# Patient Record
Sex: MALE | Race: WHITE | NOT HISPANIC OR LATINO | URBAN - METROPOLITAN AREA
[De-identification: names, ages, dates, MRNs, and addresses within clinical notes are randomized per-mention and may not be internally consistent; named-entity substitution may affect disease eponyms.]

---

## 2020-06-16 RX ORDER — CARVEDILOL 25 MG/1
25 TABLET ORAL 2 TIMES DAILY WITH MEALS
COMMUNITY

## 2020-06-16 RX ORDER — ASPIRIN 81 MG/1
81 TABLET ORAL DAILY
Status: ON HOLD | COMMUNITY
End: 2020-06-26 | Stop reason: SDUPTHER

## 2020-06-16 RX ORDER — UBIDECARENONE 100 MG
100 CAPSULE ORAL DAILY
Status: ON HOLD | COMMUNITY
End: 2020-06-26 | Stop reason: SDUPTHER

## 2020-06-16 RX ORDER — VIT C/E/ZN/COPPR/LUTEIN/ZEAXAN 250MG-90MG
1000 CAPSULE ORAL DAILY
COMMUNITY

## 2020-06-16 RX ORDER — RAMIPRIL 10 MG/1
10 CAPSULE ORAL EVERY MORNING
COMMUNITY

## 2020-06-16 RX ORDER — ATORVASTATIN CALCIUM 40 MG/1
40 TABLET, FILM COATED ORAL NIGHTLY
COMMUNITY

## 2020-06-16 RX ORDER — ALBUTEROL SULFATE 90 UG/1
2 INHALANT RESPIRATORY (INHALATION) EVERY 6 HOURS PRN
COMMUNITY

## 2020-06-16 SDOH — HEALTH STABILITY: MENTAL HEALTH: HOW OFTEN DO YOU HAVE A DRINK CONTAINING ALCOHOL?: 4 OR MORE TIMES A WEEK

## 2020-06-16 NOTE — PRE-PROCEDURE INSTRUCTIONS
1. Admissions will call you with your arrival time on Thurs 6/25/20 between 2pm - 4pm. For questions about your arrival time, please call [phone].    2. Please report to the [facility] in the [place] on the day of your procedure. Enter the hospital through the [place] [facility] (main entrance at [address], [place]). Bring your insurance card and photo ID.     3. Please follow these fasting guidelines:  - STOP all solid food 8 hours prior to arrival.   - No more than 12oz of water is permitted and must STOP 2 HOURS prior to arrival to the hospital.     4. Early on the morning of the procedure, please take the following medications listed below with a sip of water, in addition to any medications prescribed by your surgeon: coreg, davidgy    *NO aspirin, ibuprofen, anti-inflammatories, fish oil or Vitamin E unless ordered by physician.      5. Other instructions: antibacterial shower x 2, brush teeth, bring inhalers and CPAP    6. If you develop a cough, cold, fever, or other symptom prior to the date of the procedure, please report it to your physician immediately.    7. If you need to cancel the procedure for any reason, please contact your physician.    8. Make arrangements to have someone drive you home from the procedure. If you have not arranged for transportation home, your surgery may be cancelled.     9. You may not take public transportation or or a cab unless accompanied by a responsible person.    10. You may not drive a car or operate complex or potentially dangerous machinery for 24 hours following anesthesia and/or sedation.    11. If it is medically necessary for you to have a longer stay, you will be informed as soon as the decision is made.    12. Do not wear or bring anything of value to the hospital, including jewelry of any kind. Do not wear make-up or contact lenses. DO bring your glasses and hearing aid, with a case.    13. Dress in comfortable clothes.    14. If  instructed, please bring a copy of your Advance Directive (Living Will/Durable Power of ) on the day of your procedure.    Pre operative instructions given as per protocol.  Form explained by:     Sheree Hinton RN, BSN

## 2020-06-17 ENCOUNTER — TRANSCRIBE ORDERS (OUTPATIENT)
Dept: PREADMISSION TESTING | Facility: HOSPITAL | Age: 70
End: 2020-06-17

## 2020-06-17 ENCOUNTER — APPOINTMENT (OUTPATIENT)
Dept: PREADMISSION TESTING | Facility: HOSPITAL | Age: 70
End: 2020-06-17
Attending: ORTHOPAEDIC SURGERY
Payer: MEDICARE

## 2020-06-17 ENCOUNTER — APPOINTMENT (OUTPATIENT)
Dept: LAB | Facility: HOSPITAL | Age: 70
End: 2020-06-17
Attending: ORTHOPAEDIC SURGERY
Payer: MEDICARE

## 2020-06-17 ENCOUNTER — HOSPITAL ENCOUNTER (OUTPATIENT)
Dept: CARDIOLOGY | Facility: HOSPITAL | Age: 70
Discharge: HOME | End: 2020-06-17
Attending: ORTHOPAEDIC SURGERY
Payer: MEDICARE

## 2020-06-17 DIAGNOSIS — E78.5 DYSLIPIDEMIA: ICD-10-CM

## 2020-06-17 DIAGNOSIS — M48.062 SPINAL STENOSIS, LUMBAR REGION WITH NEUROGENIC CLAUDICATION: ICD-10-CM

## 2020-06-17 DIAGNOSIS — Z01.818 ENCOUNTER FOR OTHER PREPROCEDURAL EXAMINATION: ICD-10-CM

## 2020-06-17 DIAGNOSIS — M48.062 SPINAL STENOSIS, LUMBAR REGION WITH NEUROGENIC CLAUDICATION: Primary | ICD-10-CM

## 2020-06-17 DIAGNOSIS — Z78.9 ALCOHOL USE: ICD-10-CM

## 2020-06-17 DIAGNOSIS — I10 ESSENTIAL HYPERTENSION: ICD-10-CM

## 2020-06-17 DIAGNOSIS — Z72.0 TOBACCO ABUSE: ICD-10-CM

## 2020-06-17 DIAGNOSIS — G47.33 OBSTRUCTIVE SLEEP APNEA SYNDROME: ICD-10-CM

## 2020-06-17 DIAGNOSIS — J44.9 CHRONIC OBSTRUCTIVE PULMONARY DISEASE, UNSPECIFIED COPD TYPE (CMS/HCC): ICD-10-CM

## 2020-06-17 DIAGNOSIS — Z01.818 PRE-OP EVALUATION: Primary | ICD-10-CM

## 2020-06-17 DIAGNOSIS — Z11.59 ENCOUNTER FOR SCREENING FOR OTHER VIRAL DISEASES: ICD-10-CM

## 2020-06-17 DIAGNOSIS — I25.10 CORONARY ARTERY DISEASE INVOLVING NATIVE CORONARY ARTERY OF NATIVE HEART WITHOUT ANGINA PECTORIS: ICD-10-CM

## 2020-06-17 DIAGNOSIS — M48.062 PSEUDOCLAUDICATION SYNDROME: ICD-10-CM

## 2020-06-17 DIAGNOSIS — R73.03 PRE-DIABETES: ICD-10-CM

## 2020-06-17 LAB
ABO + RH BLD: NORMAL
ALBUMIN SERPL-MCNC: 3.8 G/DL (ref 3.4–5)
ALP SERPL-CCNC: 90 IU/L (ref 35–126)
ALT SERPL-CCNC: 24 IU/L (ref 16–63)
ANION GAP SERPL CALC-SCNC: 9 MEQ/L (ref 3–15)
AST SERPL-CCNC: 23 IU/L (ref 15–41)
ATRIAL RATE: 74
BILIRUB SERPL-MCNC: 0.9 MG/DL (ref 0.3–1.2)
BLD GP AB SCN SERPL QL: NEGATIVE
BLOOD BANK CMNT PATIENT-IMP: NORMAL
BUN SERPL-MCNC: 21 MG/DL (ref 8–20)
CALCIUM SERPL-MCNC: 9.4 MG/DL (ref 8.9–10.3)
CHLORIDE SERPL-SCNC: 100 MEQ/L (ref 98–109)
CO2 SERPL-SCNC: 27 MEQ/L (ref 22–32)
CREAT SERPL-MCNC: 0.9 MG/DL (ref 0.8–1.3)
D AG BLD QL: POSITIVE
ERYTHROCYTE [DISTWIDTH] IN BLOOD BY AUTOMATED COUNT: 12.7 % (ref 11.6–14.4)
EST. AVERAGE GLUCOSE BLD GHB EST-MCNC: 120 MG/DL
GFR SERPL CREATININE-BSD FRML MDRD: >60 ML/MIN/1.73M*2
GLUCOSE SERPL-MCNC: 92 MG/DL (ref 70–99)
HBA1C MFR BLD HPLC: 5.8 %
HCT VFR BLDCO AUTO: 47 % (ref 40.1–51)
HGB BLD-MCNC: 16 G/DL (ref 13.7–17.5)
LABORATORY COMMENT REPORT: NORMAL
MCH RBC QN AUTO: 33 PG (ref 28–33.2)
MCHC RBC AUTO-ENTMCNC: 34 G/DL (ref 32.2–36.5)
MCV RBC AUTO: 96.9 FL (ref 83–98)
P AXIS: 55
PDW BLD AUTO: 8.7 FL (ref 9.4–12.4)
PLATELET # BLD AUTO: 144 K/UL (ref 150–350)
POTASSIUM SERPL-SCNC: 4.1 MEQ/L (ref 3.6–5.1)
PR INTERVAL: 170
PROT SERPL-MCNC: 7.1 G/DL (ref 6–8.2)
QRS DURATION: 144
QT INTERVAL: 412
QTC CALCULATION(BAZETT): 457
R AXIS: 57
RBC # BLD AUTO: 4.85 M/UL (ref 4.5–5.8)
SODIUM SERPL-SCNC: 136 MEQ/L (ref 136–144)
T WAVE AXIS: 42
VENTRICULAR RATE: 74
WBC # BLD AUTO: 6.78 K/UL (ref 3.8–10.5)

## 2020-06-17 PROCEDURE — 86901 BLOOD TYPING SEROLOGIC RH(D): CPT

## 2020-06-17 PROCEDURE — 85027 COMPLETE CBC AUTOMATED: CPT

## 2020-06-17 PROCEDURE — 36415 COLL VENOUS BLD VENIPUNCTURE: CPT

## 2020-06-17 PROCEDURE — 83036 HEMOGLOBIN GLYCOSYLATED A1C: CPT | Performed by: HOSPITALIST

## 2020-06-17 PROCEDURE — 99204 OFFICE O/P NEW MOD 45 MIN: CPT | Performed by: HOSPITALIST

## 2020-06-17 PROCEDURE — 82040 ASSAY OF SERUM ALBUMIN: CPT

## 2020-06-17 PROCEDURE — 93005 ELECTROCARDIOGRAM TRACING: CPT

## 2020-06-17 NOTE — ASSESSMENT & PLAN NOTE
Drinks 2 beers with dinner  Instructed to not to drink any alcohol 3 days prior to the surgery  Patient has already quit alcohol 4 days ago

## 2020-06-17 NOTE — ASSESSMENT & PLAN NOTE
Continue bb  Hold Altace on morning of surgery and post op till documentation of normal kidney functions.

## 2020-06-17 NOTE — ASSESSMENT & PLAN NOTE
Okay to proceed with surgery without additional testing  Already cleared by pulmonary and cardiology
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
## 2020-06-17 NOTE — ASSESSMENT & PLAN NOTE
Continue CPAP post -operatively. He uses 2 L oxygen with CPAP at night  I would also recommend use of our LATRELL protocol as LATRELL places the patient at relatively increased risk (compared to a population without this diagnosis) of oxygen desaturation, cardiac events, acute respiratory failure, or an ICU transfer.
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
## 2020-06-18 VITALS
RESPIRATION RATE: 18 BRPM | SYSTOLIC BLOOD PRESSURE: 142 MMHG | OXYGEN SATURATION: 95 % | HEIGHT: 65 IN | HEART RATE: 76 BPM | TEMPERATURE: 98.1 F | DIASTOLIC BLOOD PRESSURE: 78 MMHG | BODY MASS INDEX: 37.1 KG/M2 | WEIGHT: 222.7 LBS

## 2020-06-18 PROBLEM — R73.03 PRE-DIABETES: Status: ACTIVE | Noted: 2020-06-18

## 2020-06-23 ENCOUNTER — APPOINTMENT (OUTPATIENT)
Dept: LAB | Facility: HOSPITAL | Age: 70
DRG: 516 | End: 2020-06-23
Attending: ORTHOPAEDIC SURGERY
Payer: MEDICARE

## 2020-06-23 ENCOUNTER — HOSPITAL ENCOUNTER (OUTPATIENT)
Dept: CARDIOLOGY | Facility: HOSPITAL | Age: 70
Discharge: HOME | DRG: 516 | End: 2020-06-23
Attending: ORTHOPAEDIC SURGERY
Payer: MEDICARE

## 2020-06-23 ENCOUNTER — APPOINTMENT (OUTPATIENT)
Dept: PREADMISSION TESTING | Facility: HOSPITAL | Age: 70
DRG: 516 | End: 2020-06-23
Attending: ORTHOPAEDIC SURGERY
Payer: MEDICARE

## 2020-06-23 DIAGNOSIS — M48.062 SPINAL STENOSIS, LUMBAR REGION WITH NEUROGENIC CLAUDICATION: ICD-10-CM

## 2020-06-23 DIAGNOSIS — Z01.818 ENCOUNTER FOR OTHER PREPROCEDURAL EXAMINATION: ICD-10-CM

## 2020-06-23 DIAGNOSIS — Z11.59 ENCOUNTER FOR SCREENING FOR OTHER VIRAL DISEASES: ICD-10-CM

## 2020-06-23 PROCEDURE — U0002 COVID-19 LAB TEST NON-CDC: HCPCS

## 2020-06-25 LAB — SARS-COV-2 RNA RESP QL NAA+PROBE: NOT DETECTED

## 2020-06-26 ENCOUNTER — ANESTHESIA (INPATIENT)
Dept: OPERATING ROOM | Facility: HOSPITAL | Age: 70
DRG: 516 | End: 2020-06-26
Payer: MEDICARE

## 2020-06-26 ENCOUNTER — HOSPITAL ENCOUNTER (INPATIENT)
Facility: HOSPITAL | Age: 70
LOS: 4 days | Discharge: HOME HEALTH | DRG: 516 | End: 2020-06-30
Attending: ORTHOPAEDIC SURGERY | Admitting: ORTHOPAEDIC SURGERY
Payer: MEDICARE

## 2020-06-26 ENCOUNTER — APPOINTMENT (INPATIENT)
Dept: RADIOLOGY | Facility: HOSPITAL | Age: 70
DRG: 516 | End: 2020-06-26
Attending: ORTHOPAEDIC SURGERY
Payer: MEDICARE

## 2020-06-26 ENCOUNTER — ANESTHESIA EVENT (INPATIENT)
Dept: OPERATING ROOM | Facility: HOSPITAL | Age: 70
DRG: 516 | End: 2020-06-26
Payer: MEDICARE

## 2020-06-26 DIAGNOSIS — M48.061 SPINAL STENOSIS OF LUMBAR REGION, UNSPECIFIED WHETHER NEUROGENIC CLAUDICATION PRESENT: Primary | ICD-10-CM

## 2020-06-26 PROBLEM — I27.20 PULMONARY HYPERTENSION (CMS/HCC): Status: ACTIVE | Noted: 2020-06-26

## 2020-06-26 LAB
ABO + RH BLD: NORMAL
D AG BLD QL: POSITIVE
LABORATORY COMMENT REPORT: NORMAL

## 2020-06-26 PROCEDURE — 25800000 HC PHARMACY IV SOLUTIONS: Performed by: NURSE PRACTITIONER

## 2020-06-26 PROCEDURE — 72020 X-RAY EXAM OF SPINE 1 VIEW: CPT

## 2020-06-26 PROCEDURE — 71000011 HC PACU PHASE 1 EA ADDL MIN: Performed by: ORTHOPAEDIC SURGERY

## 2020-06-26 PROCEDURE — 01NB0ZZ RELEASE LUMBAR NERVE, OPEN APPROACH: ICD-10-PCS | Performed by: ORTHOPAEDIC SURGERY

## 2020-06-26 PROCEDURE — 63600000 HC DRUGS/DETAIL CODE: Performed by: NURSE PRACTITIONER

## 2020-06-26 PROCEDURE — 63600000 HC DRUGS/DETAIL CODE: Performed by: ORTHOPAEDIC SURGERY

## 2020-06-26 PROCEDURE — 63700000 HC SELF-ADMINISTRABLE DRUG

## 2020-06-26 PROCEDURE — 71000001 HC PACU PHASE 1 INITIAL 30MIN: Performed by: ORTHOPAEDIC SURGERY

## 2020-06-26 PROCEDURE — 36415 COLL VENOUS BLD VENIPUNCTURE: CPT | Performed by: ORTHOPAEDIC SURGERY

## 2020-06-26 PROCEDURE — C1776 JOINT DEVICE (IMPLANTABLE): HCPCS | Performed by: ORTHOPAEDIC SURGERY

## 2020-06-26 PROCEDURE — 12000000 HC ROOM AND CARE MED/SURG

## 2020-06-26 PROCEDURE — 37000001 HC ANESTHESIA GENERAL: Performed by: ORTHOPAEDIC SURGERY

## 2020-06-26 PROCEDURE — 25000000 HC PHARMACY GENERAL: Performed by: ANESTHESIOLOGY

## 2020-06-26 PROCEDURE — 63600000 HC DRUGS/DETAIL CODE: Performed by: ANESTHESIOLOGY

## 2020-06-26 PROCEDURE — 99232 SBSQ HOSP IP/OBS MODERATE 35: CPT | Performed by: HOSPITALIST

## 2020-06-26 PROCEDURE — 25800000 HC PHARMACY IV SOLUTIONS: Performed by: ANESTHESIOLOGY

## 2020-06-26 PROCEDURE — 27200000 HC STERILE SUPPLY: Performed by: ORTHOPAEDIC SURGERY

## 2020-06-26 PROCEDURE — 36000004 HC OR LEVEL 4 INITIAL 30MIN: Performed by: ORTHOPAEDIC SURGERY

## 2020-06-26 PROCEDURE — 36000014 HC OR LEVEL 4 EA ADDL MIN: Performed by: ORTHOPAEDIC SURGERY

## 2020-06-26 PROCEDURE — 25000000 HC PHARMACY GENERAL: Performed by: ORTHOPAEDIC SURGERY

## 2020-06-26 PROCEDURE — 01NR0ZZ RELEASE SACRAL NERVE, OPEN APPROACH: ICD-10-PCS | Performed by: ORTHOPAEDIC SURGERY

## 2020-06-26 PROCEDURE — 63700000 HC SELF-ADMINISTRABLE DRUG: Performed by: NURSE PRACTITIONER

## 2020-06-26 RX ORDER — HYDROMORPHONE HYDROCHLORIDE 1 MG/ML
0.5 INJECTION, SOLUTION INTRAMUSCULAR; INTRAVENOUS; SUBCUTANEOUS
Status: DISCONTINUED | OUTPATIENT
Start: 2020-06-26 | End: 2020-06-30 | Stop reason: HOSPADM

## 2020-06-26 RX ORDER — ONDANSETRON HYDROCHLORIDE 2 MG/ML
4 INJECTION, SOLUTION INTRAVENOUS
Status: DISCONTINUED | OUTPATIENT
Start: 2020-06-26 | End: 2020-06-30 | Stop reason: HOSPADM

## 2020-06-26 RX ORDER — CYCLOBENZAPRINE HCL 10 MG
TABLET ORAL
Status: COMPLETED
Start: 2020-06-26 | End: 2020-06-26

## 2020-06-26 RX ORDER — ROCURONIUM BROMIDE 10 MG/ML
INJECTION, SOLUTION INTRAVENOUS AS NEEDED
Status: DISCONTINUED | OUTPATIENT
Start: 2020-06-26 | End: 2020-06-26 | Stop reason: SURG

## 2020-06-26 RX ORDER — GLYCOPYRROLATE 0.6MG/3ML
SYRINGE (ML) INTRAVENOUS AS NEEDED
Status: DISCONTINUED | OUTPATIENT
Start: 2020-06-26 | End: 2020-06-26 | Stop reason: SURG

## 2020-06-26 RX ORDER — DOCUSATE SODIUM 100 MG/1
100 CAPSULE, LIQUID FILLED ORAL 2 TIMES DAILY PRN
Start: 2020-06-26 | End: 2020-07-26

## 2020-06-26 RX ORDER — IBUPROFEN 200 MG
16-32 TABLET ORAL AS NEEDED
Status: CANCELLED | OUTPATIENT
Start: 2020-06-26

## 2020-06-26 RX ORDER — IBUPROFEN 200 MG
16-32 TABLET ORAL AS NEEDED
Status: DISCONTINUED | OUTPATIENT
Start: 2020-06-26 | End: 2020-06-30 | Stop reason: HOSPADM

## 2020-06-26 RX ORDER — MIDAZOLAM HYDROCHLORIDE 2 MG/2ML
INJECTION, SOLUTION INTRAMUSCULAR; INTRAVENOUS AS NEEDED
Status: DISCONTINUED | OUTPATIENT
Start: 2020-06-26 | End: 2020-06-26 | Stop reason: SURG

## 2020-06-26 RX ORDER — HYDROCODONE BITARTRATE AND ACETAMINOPHEN 5; 325 MG/1; MG/1
1-2 TABLET ORAL EVERY 4 HOURS PRN
Start: 2020-06-26 | End: 2020-07-01

## 2020-06-26 RX ORDER — BUPIVACAINE HCL/EPINEPHRINE 0.5-1:200K
VIAL (ML) INJECTION AS NEEDED
Status: DISCONTINUED | OUTPATIENT
Start: 2020-06-26 | End: 2020-06-26 | Stop reason: HOSPADM

## 2020-06-26 RX ORDER — FENTANYL CITRATE 50 UG/ML
50 INJECTION, SOLUTION INTRAMUSCULAR; INTRAVENOUS
Status: DISCONTINUED | OUTPATIENT
Start: 2020-06-26 | End: 2020-06-30 | Stop reason: HOSPADM

## 2020-06-26 RX ORDER — IPRATROPIUM BROMIDE AND ALBUTEROL SULFATE 2.5; .5 MG/3ML; MG/3ML
3 SOLUTION RESPIRATORY (INHALATION) ONCE
Status: COMPLETED | OUTPATIENT
Start: 2020-06-26 | End: 2020-06-26

## 2020-06-26 RX ORDER — PREGABALIN 75 MG/1
150 CAPSULE ORAL
Status: COMPLETED | OUTPATIENT
Start: 2020-06-26 | End: 2020-06-26

## 2020-06-26 RX ORDER — CEFAZOLIN SODIUM 2 G/50ML
SOLUTION INTRAVENOUS
Status: COMPLETED
Start: 2020-06-26 | End: 2020-06-26

## 2020-06-26 RX ORDER — SODIUM CHLORIDE, SODIUM GLUCONATE, SODIUM ACETATE, POTASSIUM CHLORIDE AND MAGNESIUM CHLORIDE 30; 37; 368; 526; 502 MG/100ML; MG/100ML; MG/100ML; MG/100ML; MG/100ML
INJECTION, SOLUTION INTRAVENOUS CONTINUOUS PRN
Status: DISCONTINUED | OUTPATIENT
Start: 2020-06-26 | End: 2020-06-26 | Stop reason: SURG

## 2020-06-26 RX ORDER — ALBUTEROL SULFATE 90 UG/1
2 INHALANT RESPIRATORY (INHALATION) EVERY 6 HOURS PRN
Status: DISCONTINUED | OUTPATIENT
Start: 2020-06-26 | End: 2020-06-30 | Stop reason: HOSPADM

## 2020-06-26 RX ORDER — ONDANSETRON HYDROCHLORIDE 2 MG/ML
4 INJECTION, SOLUTION INTRAVENOUS EVERY 8 HOURS PRN
Status: DISCONTINUED | OUTPATIENT
Start: 2020-06-26 | End: 2020-06-30 | Stop reason: HOSPADM

## 2020-06-26 RX ORDER — AMOXICILLIN 250 MG
1 CAPSULE ORAL 2 TIMES DAILY
Status: DISCONTINUED | OUTPATIENT
Start: 2020-06-26 | End: 2020-06-30 | Stop reason: HOSPADM

## 2020-06-26 RX ORDER — DEXTROSE 40 %
15-30 GEL (GRAM) ORAL AS NEEDED
Status: CANCELLED | OUTPATIENT
Start: 2020-06-26

## 2020-06-26 RX ORDER — LABETALOL HCL 20 MG/4 ML
5 SYRINGE (ML) INTRAVENOUS AS NEEDED
Status: DISCONTINUED | OUTPATIENT
Start: 2020-06-26 | End: 2020-06-30 | Stop reason: HOSPADM

## 2020-06-26 RX ORDER — IBUPROFEN 200 MG
400 TABLET ORAL EVERY 6 HOURS PRN
COMMUNITY
End: 2020-06-30 | Stop reason: HOSPADM

## 2020-06-26 RX ORDER — PHENYLEPHRINE HYDROCHLORIDE 10 MG/ML
INJECTION INTRAVENOUS AS NEEDED
Status: DISCONTINUED | OUTPATIENT
Start: 2020-06-26 | End: 2020-06-26 | Stop reason: SURG

## 2020-06-26 RX ORDER — ASPIRIN 81 MG/1
81 TABLET ORAL DAILY
Refills: 0
Start: 2020-06-29

## 2020-06-26 RX ORDER — DEXTROSE 50 % IN WATER (D50W) INTRAVENOUS SYRINGE
25 AS NEEDED
Status: CANCELLED | OUTPATIENT
Start: 2020-06-26

## 2020-06-26 RX ORDER — DEXAMETHASONE SODIUM PHOSPHATE 4 MG/ML
INJECTION, SOLUTION INTRA-ARTICULAR; INTRALESIONAL; INTRAMUSCULAR; INTRAVENOUS; SOFT TISSUE AS NEEDED
Status: DISCONTINUED | OUTPATIENT
Start: 2020-06-26 | End: 2020-06-26 | Stop reason: SURG

## 2020-06-26 RX ORDER — ONDANSETRON HYDROCHLORIDE 2 MG/ML
INJECTION, SOLUTION INTRAVENOUS AS NEEDED
Status: DISCONTINUED | OUTPATIENT
Start: 2020-06-26 | End: 2020-06-26 | Stop reason: SURG

## 2020-06-26 RX ORDER — ACETAMINOPHEN 325 MG/1
TABLET ORAL
Status: COMPLETED
Start: 2020-06-26 | End: 2020-06-26

## 2020-06-26 RX ORDER — PREGABALIN 75 MG/1
CAPSULE ORAL
Status: COMPLETED
Start: 2020-06-26 | End: 2020-06-26

## 2020-06-26 RX ORDER — UBIDECARENONE 100 MG
100 CAPSULE ORAL DAILY
Qty: 30 CAPSULE | Refills: 0
Start: 2020-06-26 | End: 2020-07-26

## 2020-06-26 RX ORDER — ATORVASTATIN CALCIUM 40 MG/1
40 TABLET, FILM COATED ORAL NIGHTLY
Status: DISCONTINUED | OUTPATIENT
Start: 2020-06-26 | End: 2020-06-30 | Stop reason: HOSPADM

## 2020-06-26 RX ORDER — CYCLOBENZAPRINE HCL 10 MG
10 TABLET ORAL EVERY 8 HOURS PRN
Refills: 0
Start: 2020-06-26 | End: 2020-07-26

## 2020-06-26 RX ORDER — VANCOMYCIN HYDROCHLORIDE 500 MG/10ML
INJECTION, POWDER, LYOPHILIZED, FOR SOLUTION INTRAVENOUS AS NEEDED
Status: DISCONTINUED | OUTPATIENT
Start: 2020-06-26 | End: 2020-06-26 | Stop reason: HOSPADM

## 2020-06-26 RX ORDER — BISACODYL 10 MG/1
10 SUPPOSITORY RECTAL DAILY PRN
Status: DISCONTINUED | OUTPATIENT
Start: 2020-06-26 | End: 2020-06-30 | Stop reason: HOSPADM

## 2020-06-26 RX ORDER — CYCLOBENZAPRINE HCL 10 MG
10 TABLET ORAL
Status: COMPLETED | OUTPATIENT
Start: 2020-06-26 | End: 2020-06-26

## 2020-06-26 RX ORDER — RAMIPRIL 5 MG/1
10 CAPSULE ORAL EVERY MORNING
Status: DISCONTINUED | OUTPATIENT
Start: 2020-06-26 | End: 2020-06-30 | Stop reason: HOSPADM

## 2020-06-26 RX ORDER — CARVEDILOL 25 MG/1
25 TABLET ORAL 2 TIMES DAILY WITH MEALS
Status: DISCONTINUED | OUTPATIENT
Start: 2020-06-27 | End: 2020-06-30 | Stop reason: HOSPADM

## 2020-06-26 RX ORDER — ETOMIDATE 2 MG/ML
INJECTION INTRAVENOUS AS NEEDED
Status: DISCONTINUED | OUTPATIENT
Start: 2020-06-26 | End: 2020-06-26 | Stop reason: SURG

## 2020-06-26 RX ORDER — DIPHENHYDRAMINE HCL 25 MG
25 CAPSULE ORAL EVERY 6 HOURS PRN
Status: DISCONTINUED | OUTPATIENT
Start: 2020-06-26 | End: 2020-06-30 | Stop reason: HOSPADM

## 2020-06-26 RX ORDER — HYDROCODONE BITARTRATE AND ACETAMINOPHEN 5; 325 MG/1; MG/1
1-2 TABLET ORAL EVERY 4 HOURS PRN
Status: DISCONTINUED | OUTPATIENT
Start: 2020-06-26 | End: 2020-06-30 | Stop reason: HOSPADM

## 2020-06-26 RX ORDER — CYCLOBENZAPRINE HCL 10 MG
10 TABLET ORAL EVERY 8 HOURS PRN
Status: DISCONTINUED | OUTPATIENT
Start: 2020-06-26 | End: 2020-06-30 | Stop reason: HOSPADM

## 2020-06-26 RX ORDER — DEXTROSE 50 % IN WATER (D50W) INTRAVENOUS SYRINGE
25 AS NEEDED
Status: DISCONTINUED | OUTPATIENT
Start: 2020-06-26 | End: 2020-06-30 | Stop reason: HOSPADM

## 2020-06-26 RX ORDER — SODIUM CHLORIDE 9 MG/ML
INJECTION, SOLUTION INTRAVENOUS CONTINUOUS
Status: ACTIVE | OUTPATIENT
Start: 2020-06-26 | End: 2020-06-27

## 2020-06-26 RX ORDER — DEXTROSE 40 %
15-30 GEL (GRAM) ORAL AS NEEDED
Status: DISCONTINUED | OUTPATIENT
Start: 2020-06-26 | End: 2020-06-30 | Stop reason: HOSPADM

## 2020-06-26 RX ORDER — PROPOFOL 10 MG/ML
INJECTION, EMULSION INTRAVENOUS AS NEEDED
Status: DISCONTINUED | OUTPATIENT
Start: 2020-06-26 | End: 2020-06-26 | Stop reason: SURG

## 2020-06-26 RX ORDER — CEFAZOLIN SODIUM 2 G/50ML
2 SOLUTION INTRAVENOUS
Status: COMPLETED | OUTPATIENT
Start: 2020-06-26 | End: 2020-06-26

## 2020-06-26 RX ORDER — ACETAMINOPHEN 325 MG/1
975 TABLET ORAL
Status: COMPLETED | OUTPATIENT
Start: 2020-06-26 | End: 2020-06-26

## 2020-06-26 RX ORDER — NEOSTIGMINE METHYLSULFATE 1 MG/ML
INJECTION INTRAVENOUS AS NEEDED
Status: DISCONTINUED | OUTPATIENT
Start: 2020-06-26 | End: 2020-06-26 | Stop reason: SURG

## 2020-06-26 RX ORDER — POLYETHYLENE GLYCOL 3350 17 G/17G
17 POWDER, FOR SOLUTION ORAL DAILY
Status: DISCONTINUED | OUTPATIENT
Start: 2020-06-27 | End: 2020-06-30 | Stop reason: HOSPADM

## 2020-06-26 RX ORDER — SODIUM CHLORIDE 9 MG/ML
INJECTION, SOLUTION INTRAVENOUS CONTINUOUS PRN
Status: DISCONTINUED | OUTPATIENT
Start: 2020-06-26 | End: 2020-06-26 | Stop reason: SURG

## 2020-06-26 RX ORDER — OXYCODONE HYDROCHLORIDE 5 MG/1
5-10 TABLET ORAL EVERY 4 HOURS PRN
Status: DISCONTINUED | OUTPATIENT
Start: 2020-06-26 | End: 2020-06-26

## 2020-06-26 RX ORDER — HEPARIN SODIUM 5000 [USP'U]/ML
5000 INJECTION, SOLUTION INTRAVENOUS; SUBCUTANEOUS EVERY 8 HOURS
Status: DISCONTINUED | OUTPATIENT
Start: 2020-06-28 | End: 2020-06-30 | Stop reason: HOSPADM

## 2020-06-26 RX ORDER — HYDROMORPHONE HYDROCHLORIDE 1 MG/ML
INJECTION, SOLUTION INTRAMUSCULAR; INTRAVENOUS; SUBCUTANEOUS AS NEEDED
Status: DISCONTINUED | OUTPATIENT
Start: 2020-06-26 | End: 2020-06-26 | Stop reason: SURG

## 2020-06-26 RX ORDER — ALUMINUM HYDROXIDE, MAGNESIUM HYDROXIDE, AND SIMETHICONE 1200; 120; 1200 MG/30ML; MG/30ML; MG/30ML
30 SUSPENSION ORAL EVERY 4 HOURS PRN
Status: DISCONTINUED | OUTPATIENT
Start: 2020-06-26 | End: 2020-06-30 | Stop reason: HOSPADM

## 2020-06-26 RX ORDER — FENTANYL CITRATE 50 UG/ML
INJECTION, SOLUTION INTRAMUSCULAR; INTRAVENOUS AS NEEDED
Status: DISCONTINUED | OUTPATIENT
Start: 2020-06-26 | End: 2020-06-26 | Stop reason: SURG

## 2020-06-26 RX ADMIN — RAMIPRIL 10 MG: 5 CAPSULE ORAL at 20:14

## 2020-06-26 RX ADMIN — ROCURONIUM BROMIDE 45 MG: 10 INJECTION, SOLUTION INTRAVENOUS at 12:33

## 2020-06-26 RX ADMIN — Medication 10 MG: at 09:11

## 2020-06-26 RX ADMIN — PHENYLEPHRINE HYDROCHLORIDE 100 MCG: 10 INJECTION INTRAVENOUS at 13:25

## 2020-06-26 RX ADMIN — CYCLOBENZAPRINE HYDROCHLORIDE 10 MG: 10 TABLET, FILM COATED ORAL at 09:11

## 2020-06-26 RX ADMIN — SUCCINYLCHOLINE CHLORIDE 120 MG: 20 INJECTION, SOLUTION INTRAMUSCULAR; INTRAVENOUS; PARENTERAL at 12:30

## 2020-06-26 RX ADMIN — SODIUM CHLORIDE: 900 INJECTION, SOLUTION INTRAVENOUS at 12:27

## 2020-06-26 RX ADMIN — MIDAZOLAM HYDROCHLORIDE 2 MG: 1 INJECTION, SOLUTION INTRAMUSCULAR; INTRAVENOUS at 12:27

## 2020-06-26 RX ADMIN — ROCURONIUM BROMIDE 20 MG: 10 INJECTION, SOLUTION INTRAVENOUS at 13:16

## 2020-06-26 RX ADMIN — PHENYLEPHRINE HYDROCHLORIDE 25 MCG/MIN: 10 INJECTION INTRAVENOUS at 13:39

## 2020-06-26 RX ADMIN — ACETAMINOPHEN 975 MG: 325 TABLET ORAL at 09:10

## 2020-06-26 RX ADMIN — PROPOFOL 50 MG: 10 INJECTION, EMULSION INTRAVENOUS at 12:30

## 2020-06-26 RX ADMIN — HYDROCODONE BITARTRATE AND ACETAMINOPHEN 1 TABLET: 5; 325 TABLET ORAL at 23:59

## 2020-06-26 RX ADMIN — ETOMIDATE 12 MG: 2 INJECTION, SOLUTION INTRAVENOUS at 12:30

## 2020-06-26 RX ADMIN — IPRATROPIUM BROMIDE AND ALBUTEROL SULFATE 3 ML: 2.5; .5 SOLUTION RESPIRATORY (INHALATION) at 16:15

## 2020-06-26 RX ADMIN — ONDANSETRON 4 MG: 2 INJECTION INTRAMUSCULAR; INTRAVENOUS at 15:33

## 2020-06-26 RX ADMIN — PHENYLEPHRINE HYDROCHLORIDE 100 MCG: 10 INJECTION INTRAVENOUS at 13:03

## 2020-06-26 RX ADMIN — SENNOSIDES AND DOCUSATE SODIUM 1 TABLET: 8.6; 5 TABLET ORAL at 20:14

## 2020-06-26 RX ADMIN — FENTANYL CITRATE 50 MCG: 50 INJECTION, SOLUTION INTRAMUSCULAR; INTRAVENOUS at 15:51

## 2020-06-26 RX ADMIN — PHENYLEPHRINE HYDROCHLORIDE 100 MCG: 10 INJECTION INTRAVENOUS at 13:30

## 2020-06-26 RX ADMIN — PHENYLEPHRINE HYDROCHLORIDE 100 MCG: 10 INJECTION INTRAVENOUS at 12:53

## 2020-06-26 RX ADMIN — Medication 0.5 MG: at 15:49

## 2020-06-26 RX ADMIN — ROCURONIUM BROMIDE 5 MG: 10 INJECTION, SOLUTION INTRAVENOUS at 12:30

## 2020-06-26 RX ADMIN — CEFAZOLIN 2 G: 330 INJECTION, POWDER, FOR SOLUTION INTRAMUSCULAR; INTRAVENOUS at 21:30

## 2020-06-26 RX ADMIN — PHENYLEPHRINE HYDROCHLORIDE 100 MCG: 10 INJECTION INTRAVENOUS at 13:21

## 2020-06-26 RX ADMIN — CEFAZOLIN 2 G: 330 INJECTION, POWDER, FOR SOLUTION INTRAMUSCULAR; INTRAVENOUS at 12:45

## 2020-06-26 RX ADMIN — SODIUM CHLORIDE: 9 INJECTION, SOLUTION INTRAVENOUS at 20:15

## 2020-06-26 RX ADMIN — NEOSTIGMINE METHYLSULFATE 3 MG: 1 INJECTION INTRAVENOUS at 15:49

## 2020-06-26 RX ADMIN — FENTANYL CITRATE 50 MCG: 50 INJECTION, SOLUTION INTRAMUSCULAR; INTRAVENOUS at 15:49

## 2020-06-26 RX ADMIN — ATORVASTATIN CALCIUM 40 MG: 40 TABLET, FILM COATED ORAL at 22:47

## 2020-06-26 RX ADMIN — SODIUM CHLORIDE, SODIUM GLUCONATE, SODIUM ACETATE, POTASSIUM CHLORIDE AND MAGNESIUM CHLORIDE: 526; 502; 368; 37; 30 INJECTION, SOLUTION INTRAVENOUS at 12:35

## 2020-06-26 RX ADMIN — FENTANYL CITRATE 25 MCG: 50 INJECTION, SOLUTION INTRAMUSCULAR; INTRAVENOUS at 17:01

## 2020-06-26 RX ADMIN — FENTANYL CITRATE 50 MCG: 50 INJECTION, SOLUTION INTRAMUSCULAR; INTRAVENOUS at 16:32

## 2020-06-26 RX ADMIN — PHENYLEPHRINE HYDROCHLORIDE 100 MCG: 10 INJECTION INTRAVENOUS at 12:55

## 2020-06-26 RX ADMIN — FENTANYL CITRATE 100 MCG: 50 INJECTION, SOLUTION INTRAMUSCULAR; INTRAVENOUS at 12:30

## 2020-06-26 RX ADMIN — HYDROCODONE BITARTRATE AND ACETAMINOPHEN 1 TABLET: 5; 325 TABLET ORAL at 18:46

## 2020-06-26 RX ADMIN — CYCLOBENZAPRINE HYDROCHLORIDE 10 MG: 10 TABLET, FILM COATED ORAL at 18:17

## 2020-06-26 RX ADMIN — HYDROMORPHONE HYDROCHLORIDE 0.5 MG: 1 INJECTION, SOLUTION INTRAMUSCULAR; INTRAVENOUS; SUBCUTANEOUS at 12:36

## 2020-06-26 RX ADMIN — DEXAMETHASONE SODIUM PHOSPHATE 4 MG: 4 INJECTION, SOLUTION INTRA-ARTICULAR; INTRALESIONAL; INTRAMUSCULAR; INTRAVENOUS; SOFT TISSUE at 12:54

## 2020-06-26 RX ADMIN — PREGABALIN 150 MG: 75 CAPSULE ORAL at 09:10

## 2020-06-26 ASSESSMENT — LIFESTYLE VARIABLES: TOBACCO_USE: 1

## 2020-06-26 ASSESSMENT — PAIN SCALES - GENERAL: PAIN_LEVEL: 0
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
## 2020-06-26 NOTE — ANESTHESIA POSTPROCEDURE EVALUATION
Patient: Scottie Genao    Procedure Summary     Date:  06/26/20 Room / Location:  [facility] PAV OR 04 / [facility] OR PAV    Anesthesia Start:  1227 Anesthesia Stop:  1613    Procedure:  Posterior L1-S1 Laminectomy (N/A Back) Diagnosis:       Spinal stenosis, lumbar region, with neurogenic claudication      (Stenosis)    Surgeon:  Delfino Momin MD Responsible Provider:  Maida Melendrez MD    Anesthesia Type:  general ASA Status:  3          Anesthesia Type: general  PACU Vitals     No data found in the last 10 encounters.            Anesthesia Post Evaluation    Pain score: 0  Pain management: adequate  Patient location during evaluation: PACU  Patient participation: complete - patient participated  Level of consciousness: awake and alert  Cardiovascular status: acceptable  Airway Patency: adequate  Respiratory status: acceptable  Hydration status: acceptable  Anesthetic complications: no  Comments: Duoneb on arrival to PACU. Pt is a smoker and had mucus in ETT on extubation . Internal med doc at bedside in PACU. If no need for labs, will d/c A-line in PACU. Chhaya PERRY aware.

## 2020-06-26 NOTE — ASSESSMENT & PLAN NOTE
Hemoglobin A1c was 5.8 preoperatively.  Outpatient follow-up with PCP and consider lifestyle modification.

## 2020-06-26 NOTE — PROGRESS NOTES
Orthopaedic Spine Surgery Postoperative Check    [S]  Patient doing well postoperatively, resting comfortably in PACU. Mild incisional pain but otherwise doing well.     [O]    Vitals:    06/26/20 1630   BP: 137/81   Pulse: 75   Resp: 17   Temp:    SpO2:          Physical Exam    dressing in place, clean, dry, intact.  Drain: HV to suction x1    RLE:  Inspection: No gross deformity. Skin in tact.   Neurovascular: Palpable DP pulse. SILT L1-S1 distibution.  Motor: IP 5/5, Quad 5/5, TA 5/5, EHL 5/5, GS 5/5    LLE:  Inspection: No gross deformity. Skin in tact.   Neurovascular: Palpable DP pulse. SILT L1-S1 distibution.  Motor: IP 5/5, Quad 5/5, TA 5/5, EHL 5/5, GS 5/5    AP: 69 y.o. male POD 0 s/p L1-S1 PLD    -- Pain control  -- WB: WBAT  -- DVT: SQH on POD2  -- Advance diet as tolerated  -- Monitor LUZMARIA drain output, set to gravity  -- Continue waters  -- PT/OT  -- Monitor neurovascular status  - CAREN dressing to be applied    Vipin Henderson MD

## 2020-06-26 NOTE — CONSULTS
"   Hospital Medicine Service -  Daily Progress Note       SUBJECTIVE   Interval History: seen in pacu.   Having some back pain.  No cp or sob     OBJECTIVE      Vital signs in last 24 hours:  Temp:  [36.3 °C (97.4 °F)-36.4 °C (97.6 °F)] 36.4 °C (97.6 °F)  Heart Rate:  [69-86] 86  Resp:  [16-26] 26  BP: (154-162)/(91-93) 154/91    Intake/Output Summary (Last 24 hours) at 6/26/2020 1624  Last data filed at 6/26/2020 1612  Gross per 24 hour   Intake 1800 ml   Output -1550 ml   Net 3350 ml       PHYSICAL EXAMINATION      Physical Exam   Visit Vitals  BP (!) 154/91   Pulse 86   Temp 36.4 °C (97.6 °F) (Temporal)   Resp (!) 26   Ht 1.651 m (5' 5\")   Wt 98 kg (216 lb 1 oz)   SpO2 96%   BMI 35.95 kg/m²       Physical Exam  General Appearance:        Awake.   Head:    Normocephalic   Eyes:    No icterus   Respiratory:     Coarse sounds clear with coughing.   Cardiovascular:    Regular rate and rhythm   GI:     Soft, non-tender, bowel sounds active             LINES, CATHETERS, DRAINS, AIRWAYS, AND WOUNDS   Lines, Drains, Airways, Wounds:       Comments:      LABS / IMAGING / TELE      Labs  Creat 0.9 preop    ASSESSMENT AND PLAN      Spinal stenosis of lumbar region  Assessment & Plan  S/p lumbar surgery  Continue PT/OT  Encourage IS  Continue Bowel regimen  DVT prophylaxis and pain meds per ortho    Pulmonary hypertension (CMS/HCC)  Assessment & Plan  Patient noted to have mod-severe pulm HTN on outpt records/echo  Monitor respiratory and volume status      Obstructive sleep apnea syndrome  Assessment & Plan  Continue CPAP with 2L O2 at night and when napping  Minimize opiates and sedatives as able    Coronary artery disease involving native coronary artery of native heart without angina pectoris  Assessment & Plan  Continue beta-blocker and statin.  Resume aspirin when okay with surgery.  Resume ACE inhibitor.  Given hx of LATRELL, mod-severe pulm htn, and cardiac disease, I suggest keeping on tele monitor " overnight.    Pre-diabetes  Assessment & Plan  Hemoglobin A1c was 5.8 preoperatively.  Outpatient follow-up with PCP and consider lifestyle modification.    Alcohol use  Assessment & Plan  Monitor for symptoms of withdrawal    Tobacco abuse  Assessment & Plan  Encourage smoking cessation    Dyslipidemia  Assessment & Plan  Continue statin    Essential hypertension  Assessment & Plan  Continue Coreg.  Resume ACE inhibitor  Monitor blood pressure  Check BMP in the morning    COPD (chronic obstructive pulmonary disease) (CMS/[facility])  Assessment & Plan  Continue Trelegy and CPAP  Continue albuterol prn          Griffin Holliday,   6/26/2020

## 2020-06-26 NOTE — ANESTHESIA PROCEDURE NOTES
Airway  Urgency: elective    Start Time: 6/26/2020 12:32 PM  Airway not difficult    General Information and Staff    Patient location during procedure: OR  Anesthesiologist: Maida Melendrez MD  Performed: anesthesiologist     Indications and Patient Condition  Indications for airway management: anesthesia and airway protection  Sedation level: deep  Preoxygenated: yes  Patient position: sniffing  MILS maintained throughout      Final Airway Details  Final airway type: endotracheal airway      Successful airway: ETT  Cuffed: yes   Successful intubation technique: video laryngoscopy  Blade size: #3  ETT size (mm): 8.0  Cormack-Lehane Classification: grade IIa - partial view of glottis  Placement verified by: chest auscultation and capnometry   Measured from: lips  ETT to lips (cm): 23  Number of attempts at approach: 1  Number of other approaches attempted: 0  Atraumatic airway insertion      Additional Comments  Teeth intact; implants intact

## 2020-06-26 NOTE — DISCHARGE INSTRUCTIONS
-Please keep CAREN dressing on until 7/3/2020   -You may shower with the CAREN dressing in place however you should not submerge your incision or let direct water pressure hit the dressing. You may either disconnect the pump and place a piece of tape over the tubing to prevent water from entering tubing or your may leave the tubing and the pump outside of the shower (there is 4 ft of tubing).   -After the shower pat dressing dry.     Delfino Momin MD  , Orthopaedic Spine Surgery  [facility]  Chhaya Castillo PA-C  Office: [phone]  Fax: [phone]    Lumbar Laminectomy  Preoperative, Postoperative and Home Recovery Instructions    After Your Operation  Pain  After surgery you will experience pain in the region of the incision. Some leg pain as well as tingling or numbness may also be present. Initially it may be of greater intensity than pre-operatively but will subside over time as the healing process occurs. This discomfort is caused from surgical retraction of tissue as well as inflammation and swelling of the previously compressed nerves. This may occur for several weeks and is NORMAL. Persistent pain should be reported to your physician.  Some patients experience a sore throat and swallowing difficulty after general anesthesia and surgery. This is from manipulation of tissue and the presence of the breathing tube for anesthesia. The sore throat usually will subside within a week. The swallowing difficulty usually takes longer. Using throat lozenges or lemon drops, sipping cool liquids, or sucking ice chips may soothe this pain.    Use of Pain Medication  Narcotic pain medication will be available for pain relief after surgery. Narcotics are very effective for pain relief but may cause other side effects. The possible effects vary among patients and may include: sleepiness, nausea, constipation, flushing, sweating, and occasionally euphoria or  confused feelings are possible. If these occur notify your nurse.     Activity  Feel free to move about in your bed. The nurse or therapist will assist you in getting out of bed a few hours after surgery. You will be instructed to be up walking every 2 to 3 hours during the day and evening. The nurse will allow you to do this independently once you are steady and feel comfortable.  Early activity after surgery is extremely important to help prevent the complications of prolonged bed rest such as pneumonia and blood clots. It also promotes recovery, relieves muscle stiffness, allows for development of a well-organized scar, and improves your outlook.  Do not start any programs of exercise or physical therapy unless discussed with your doctor.    Diet  Your diet will begin with clear liquids, and be advanced to your normal daily diet as soon as your condition permits. Your IV will be removed as soon as we are reasonably certain it will no longer be required for medications and hydration.    Bowel and Bladder Function  During surgery you may have a catheter (tube) in your bladder to monitor your urine output. Upon its removal you may feel a stinging sensation for 2 to 3 days, which is normal. Some patients may have difficulty urinating after surgery. If this occurs, notify your nurse who may assist you in voiding  techniques. This may require placing a catheter in your bladder.  After surgery, constipation frequently occurs from inactivity and the side effects of pain medication. Stool softeners and laxatives will be available from your nurse.    Respiratory Hygiene  Deep breathing is very important after surgery to maintain lung expansion and reduce the risk of pneumonia. You will be provided with an incentive spirometer and instructed about its use. This device should be used every 15 to 30 minutes during your wakeful hours initially, then every 1 to 2 hours as your activity returns to normal. This device is yours to  take home. Continue to use it at home for at least 1 week after your discharge. (Use it during TV commercial breaks).  Smoking is absolutely forbidden. There is clear evidence that smoking dramatically increases your risk of post-operative complications. There is also evidence that smoking adversely effects bone healing and nerve recovery. Second hand smoke also applies.    Home Recovery  Follow-Up Appointment  Patients are generally discharged from the hospital 1 to 2 days after surgery. A follow-up appointment was made for Dr Momin's office 2 weeks from the date of surgery. At your first follow-up visit, you will be evaluated and the incision will be checked. You will then be seen at 6 weeks, 3 months, 6 months, 1 year and 2 years from surgery.     Incision Care and Hygiene  After the 3rd post-operative day, you are encouraged to shower daily. Do not soak your incision. Pat the incision dry. Do not apply any ointments or creams. Cover daily, for the first 7 days, with a clean dressing. A supply will be provided upon discharge. Surgical tapes or Steri-strips may be present to aid in holding the skin edges together. Allow these to fall off on their own. After five days post-operatively you no longer need to wear a bandage. NO BATHS, HOT TUBS, OR POOLS FOR 6 WEEKS AFTER SURGERY, it will increase your risk of infection.    Inflammation  Please take your temperature every afternoon for the first week after you are discharged from the hospital. Call your physician if:  1. your temperature, taken by thermometer, is more than 101.5 degrees,  OR  2. your incision becomes reddened, swollen or any increase or change in drainage occurs.    Nutrition  A well-balanced diet is necessary for good healing and recovery. This includes food from the four basic food groups: dairy products, meat, vegetables and fruit. Use of narcotic pain medication and prolonged rest may cause constipation. Drinking plenty of fluids and eating high  fiber foods (whole grains, raw fruits and vegetables) will help regain normal bowel function.    Home Pain and Medication  When we surgically relieve pressure from an inflamed, damaged nerve it does not recover instantaneously. The surgical procedure does not heal the nerve, only the body is capable of that. The goal of surgery is to create the best possible environment for the body to heal itself and to prevent further damage. This will take a variable length of time depending on the duration and degree of nerve damage and the body’s own healing abilities. Most of the healing occurs in the first few months. Pain, weakness, or numbness lasting more than six months will likely be permanent.  Everyone has a different pain tolerance that will dictate the amount of pain medication required. A decreased dose and less frequent use of pain medication will occur during your recovery period. A gradual weaning of medications should begin as soon as possible, generally within 2 to 4 weeks. Conservative use of narcotic pain medication is advised. While using narcotic pain medication you SHOULD NOT drive. One should try non-narcotic medication, such as Tylenol and reserve narcotics for only the difficult times.     Narcotics will NOT be considered for refills at night or over the weekend, or holiday.    Home Activity  A well-balanced diet is necessary for good healing and recovery. This includes food from the four basic food groups: diary products, meat, vegetables and fruit.  The First Week  Early to bed, late to rise and frequent rest periods throughout the day. Get at least 8 hours of sleep each night. A disrupted sleep pattern is common after discharge from the hospital and will return to normal over time.   You may not drive, but you may be driven, for short distances, using proper restraints such as shoulder and lap belts, FOR 2-4 WEEKS.   No lifting of more than 10 pounds   May climb stairs with hand rail   Avoid sitting  for longer than 20 minutes at a time   Begin a daily walking program with 1 to 2 blocks initially; schedule a daily time and increase distance daily.   Eat a regular, balanced diet.   Take medications as prescribed, using narcotics as needed. Avoid using NSAIDs such as Motrin or Advil.  The Second Week  Resume normal rising and retiring schedule, but continue to rest throughout the day.   You may not drive.   No lifting of anything weighing more than 10 pounds.   May climb stairs with hand rail   Continue scheduled walking, increasing distance and frequency as able.   May resume sexual relations when comfortable.   Begin narcotic weaning as pain diminishes, relying mainly on non-narcotic medications  The Third Week  Resume normal rising and retiring schedule, resting as needed.   May resume light work around the home; lifting not to exceed 10 pounds.   Continue scheduled walking.  The Fourth Week  Resume normal rising and retiring schedule, resting as needed.   May resume light work around the home; lifting not to exceed 10 pounds.   Continue scheduled walking.

## 2020-06-26 NOTE — OP NOTE
Attending Surgeon: Delfino Momin MD    Assistant: Chhaya Castillo    Preoperative diagnosis: L1-S1 Stenosis, Neurogenic Claudication    Postoperative Diagnosis: Same    Procedure: L1-S1 Decompression    Anesthesia: GET    EBL: 300 cc    Status: Stable to PACU    INDICATIONS:  Scottie Genao is a pleasant 69 y.o. male who presented to my office complaining of back and bilateral leg pain, numbness and tingling. Symptoms were progressive and were significantly affecting the patient's activity level and quality of life. Symptoms were recalcitrant to nonsurgical management. I examined the patient and reviewed the imaging studies. Imaging was notable for L1-S1 stenosis causing neurogenic claudication. I discussed my findings with the patient. We discussed both surgical and nonsurgical treatment options. The patient was aware that nonsurgical options were available but did risk persistence and potential progression of symptoms. Surgical intervention was also discussed. This would involve an L1-S1 decompression. The procedure was discussed in detail. The risks were also discussed. The patient demonstrated good understanding of the risks, benefits and alternatives and elected undergo surgery and signed the appropriate consent forms.    TECHNIQUE:  The patient was identified in the preoperative holding area. The surgical site was marked in the procedure was confirmed. The patient was taken to the operating room. Bilateral SCDs were applied. General anesthesia was induced and endotracheal intubation was performed. The patient was placed prone on the operating table. Arms were placed on arm boards and all bony prominences were well padded. The patient was prepped and draped in the usual sterile fashion. A surgical timeout was performed. Perioperative antibiotics were administered.    A midline lumbar incision was made. A standard approach to the lumbar spine was performed. A localizing film was obtained. After confirming the  level the exposure was completed. The L1-5 lamina was excised. The decompression was taken lateral out to the medial wall of the L1-S1 pedicles. The bilateral L1-2, L2-3, L3-4, L4-5 and L5-S1 foramen and lateral recess were decompressed. Upon completion of the decompression there was no further central stenosis. The bilateral L1-S1 nerve roots exited the spine without compression. The wound was copiously irrigated. Hemostasis was achieved. A deep drain was placed. Vancomycin powder was placed. The wound was closed in multiple layers and a sterile dressing was placed.    General anesthasia was discontinued and extubation was performed. The patient woke up grossly moving the lower extremities bilaterally. The patient was then taken to the recovery room in stable condition.    At the completion the case needle and sponge accounts were correct x2.    My PA, Chhaya Castillo, assisted with patient positioning, prepping, draping, tissue retraction, suctioning and wound closer. There was no qualified resident or fellow who had completed their spine rotation available to assist on the case.    I was present and participated in all key aspects of the surgical procedure.

## 2020-06-26 NOTE — NURSING NOTE
Dr. George howard aware Duvol drain put out 250 mls and patients pain. No new orders. Will monitor

## 2020-06-26 NOTE — ANESTHESIOLOGIST PRE-PROCEDURE ATTESTATION
Pre-Procedure Patient Identification:  I am the Primary Anesthesiologist and have identified the patient on 06/26/20 at 12:52 PM.   I have confirmed the following procedure(s) Posterior L1-S1 Laminectomy will be performed by the following surgeon/proceduralist Delfino Momin MD.

## 2020-06-27 LAB
ANION GAP SERPL CALC-SCNC: 5 MEQ/L (ref 3–15)
BUN SERPL-MCNC: 15 MG/DL (ref 8–20)
CALCIUM SERPL-MCNC: 8.3 MG/DL (ref 8.9–10.3)
CHLORIDE SERPL-SCNC: 102 MEQ/L (ref 98–109)
CO2 SERPL-SCNC: 25 MEQ/L (ref 22–32)
CREAT SERPL-MCNC: 0.9 MG/DL (ref 0.8–1.3)
ERYTHROCYTE [DISTWIDTH] IN BLOOD BY AUTOMATED COUNT: 12.5 % (ref 11.6–14.4)
GFR SERPL CREATININE-BSD FRML MDRD: >60 ML/MIN/1.73M*2
GLUCOSE SERPL-MCNC: 118 MG/DL (ref 70–99)
HCT VFR BLDCO AUTO: 39.7 % (ref 40.1–51)
HGB BLD-MCNC: 13.7 G/DL (ref 13.7–17.5)
MCH RBC QN AUTO: 32.9 PG (ref 28–33.2)
MCHC RBC AUTO-ENTMCNC: 34.5 G/DL (ref 32.2–36.5)
MCV RBC AUTO: 95.4 FL (ref 83–98)
PDW BLD AUTO: 8.8 FL (ref 9.4–12.4)
PLATELET # BLD AUTO: 139 K/UL (ref 150–350)
POTASSIUM SERPL-SCNC: 4.7 MEQ/L (ref 3.6–5.1)
RBC # BLD AUTO: 4.16 M/UL (ref 4.5–5.8)
SODIUM SERPL-SCNC: 132 MEQ/L (ref 136–144)
WBC # BLD AUTO: 11.13 K/UL (ref 3.8–10.5)

## 2020-06-27 PROCEDURE — 97530 THERAPEUTIC ACTIVITIES: CPT | Mod: GO

## 2020-06-27 PROCEDURE — 12000000 HC ROOM AND CARE MED/SURG

## 2020-06-27 PROCEDURE — 85027 COMPLETE CBC AUTOMATED: CPT | Performed by: NURSE PRACTITIONER

## 2020-06-27 PROCEDURE — 63700000 HC SELF-ADMINISTRABLE DRUG: Performed by: NURSE PRACTITIONER

## 2020-06-27 PROCEDURE — 63600000 HC DRUGS/DETAIL CODE: Performed by: NURSE PRACTITIONER

## 2020-06-27 PROCEDURE — 80048 BASIC METABOLIC PNL TOTAL CA: CPT | Performed by: NURSE PRACTITIONER

## 2020-06-27 PROCEDURE — 97116 GAIT TRAINING THERAPY: CPT | Mod: GP

## 2020-06-27 PROCEDURE — 97535 SELF CARE MNGMENT TRAINING: CPT | Mod: GO

## 2020-06-27 PROCEDURE — 97166 OT EVAL MOD COMPLEX 45 MIN: CPT | Mod: GO

## 2020-06-27 PROCEDURE — 36415 COLL VENOUS BLD VENIPUNCTURE: CPT | Performed by: NURSE PRACTITIONER

## 2020-06-27 PROCEDURE — 97162 PT EVAL MOD COMPLEX 30 MIN: CPT | Mod: GP

## 2020-06-27 RX ADMIN — ATORVASTATIN CALCIUM 40 MG: 40 TABLET, FILM COATED ORAL at 22:49

## 2020-06-27 RX ADMIN — HYDROCODONE BITARTRATE AND ACETAMINOPHEN 2 TABLET: 5; 325 TABLET ORAL at 18:25

## 2020-06-27 RX ADMIN — CARVEDILOL 25 MG: 25 TABLET, FILM COATED ORAL at 18:25

## 2020-06-27 RX ADMIN — CARVEDILOL 25 MG: 25 TABLET, FILM COATED ORAL at 08:58

## 2020-06-27 RX ADMIN — SENNOSIDES AND DOCUSATE SODIUM 1 TABLET: 8.6; 5 TABLET ORAL at 08:58

## 2020-06-27 RX ADMIN — HYDROCODONE BITARTRATE AND ACETAMINOPHEN 2 TABLET: 5; 325 TABLET ORAL at 12:01

## 2020-06-27 RX ADMIN — SODIUM CHLORIDE: 9 INJECTION, SOLUTION INTRAVENOUS at 05:42

## 2020-06-27 RX ADMIN — POLYETHYLENE GLYCOL 3350 17 G: 17 POWDER, FOR SOLUTION ORAL at 08:58

## 2020-06-27 RX ADMIN — RAMIPRIL 10 MG: 5 CAPSULE ORAL at 08:58

## 2020-06-27 RX ADMIN — SENNOSIDES AND DOCUSATE SODIUM 1 TABLET: 8.6; 5 TABLET ORAL at 20:34

## 2020-06-27 RX ADMIN — HYDROCODONE BITARTRATE AND ACETAMINOPHEN 2 TABLET: 5; 325 TABLET ORAL at 22:51

## 2020-06-27 RX ADMIN — HYDROCODONE BITARTRATE AND ACETAMINOPHEN 1 TABLET: 5; 325 TABLET ORAL at 06:45

## 2020-06-27 RX ADMIN — CEFAZOLIN 2 G: 330 INJECTION, POWDER, FOR SOLUTION INTRAMUSCULAR; INTRAVENOUS at 05:40

## 2020-06-27 RX ADMIN — HYDROCODONE BITARTRATE AND ACETAMINOPHEN 1 TABLET: 5; 325 TABLET ORAL at 05:34

## 2020-06-27 ASSESSMENT — COGNITIVE AND FUNCTIONAL STATUS - GENERAL
DRESSING REGULAR UPPER BODY CLOTHING: 3 - A LITTLE
HELP NEEDED FOR BATHING: 3 - A LITTLE
MOVING TO AND FROM BED TO CHAIR: 3 - A LITTLE
EATING MEALS: 4 - NONE
HELP NEEDED FOR PERSONAL GROOMING: 3 - A LITTLE
HELP NEEDED FOR PERSONAL GROOMING: 3 - A LITTLE
DRESSING REGULAR LOWER BODY CLOTHING: 3 - A LITTLE
CLIMB 3 TO 5 STEPS WITH RAILING: 3 - A LITTLE
WALKING IN HOSPITAL ROOM: 3 - A LITTLE
AFFECT: WFL
STANDING UP FROM CHAIR USING ARMS: 3 - A LITTLE
EATING MEALS: 4 - NONE
TOILETING: 3 - A LITTLE
DRESSING REGULAR LOWER BODY CLOTHING: 3 - A LITTLE
DRESSING REGULAR UPPER BODY CLOTHING: 3 - A LITTLE
HELP NEEDED FOR BATHING: 3 - A LITTLE
TOILETING: 3 - A LITTLE

## 2020-06-27 NOTE — PROGRESS NOTES
Patient: Scottie Genao  Location: [facility] [id number]  MRN: [id number]  Today's date: 6/27/2020     Patient in chair, alarm on, call bell and belongings in reach, NADPatrick Rubin is a 69 y.o. male admitted on 6/26/2020 with Spinal stenosis of lumbar region.     Past Medical History  Scottie has a past medical history of Aortic valve disease, COPD (chronic obstructive pulmonary disease) (CMS/HCC), Coronary artery disease, Diverticulitis of colon, Hypertension, Lipid disorder, Pulmonary arterial hypertension (CMS/HCC), and Sleep apnea.   History of Present Illness  S/p L1-S1 PLF, WBAT.                 PT Vitals    Date/Time Pulse SpO2 Pt Activity O2 Therapy BP BP Location BP Method Pt Position [facility]   06/27/20 0927 75 93 % At rest None (Room air) 117/65 Left upper arm Automatic Sitting MLP                 Prior Living Environment      Most Recent Value   Living Arrangements  house, other (see comments)   Living Environment Comment  3 SH, 0 SUNNY, 2 FFs to bed/bath, does have full bath on 1st fl but does want to stay on 3rd fl (26 steps to 3rd fl w/ U/L HR, stall shower                 Prior Level of Function      Most Recent Value   Equipment Currently Used at Home  glucometer, wheelchair                   PT Evaluation and Treatment - 06/27/20 0927                   Time Calculation      Start Time  0927       Stop Time  0958       Time Calculation (min)  31 min              Session Details      Document Type  initial evaluation       Mode of Treatment  physical therapy              General Information      Patient Profile Reviewed?  yes       General Observations of Patient  patient seated in chair, NAD       Existing Precautions/Restrictions  spinal              Cognition/Psychosocial      Affect/Mental Status (Cognitive)  WFL       Orientation Status (Cognition)  oriented x 4       Follows Commands (Cognition)  WNL              Sensory      Hearing Status  WNL              Vision Assessment/Intervention       Visual Impairment/Limitations  corrective lenses full time              Sensory Assessment (Somatosensory)      Sensory Assessment (Somatosensory)  bilateral LE;sensation intact              Range of Motion (ROM)      Range of Motion  ROM is WFL              Strength (Manual Muscle Testing)      Strength (Manual Muscle Testing)  strength is WFL              Transfers      Transfers  toilet transfer;car transfer              Bed to Chair Transfer      [place], Bed to Chair  distant supervision       Assistive Device  walker, front-wheeled              Chair to Bed Transfer      [place], Chair to Bed  distant supervision       Assistive Device  walker, front-wheeled              Sit to Stand Transfer      [place], Sit to Stand Transfer  distant supervision       Assistive Device  walker, front-wheeled       Comment  also distant supervision w/o AD, stated that he would not use RW at home              Stand to Sit Transfer      [place], Stand to Sit Transfer  distant supervision              Car Transfer      Transfer Technique  sit-stand;stand-sit       [place], Car Transfer  distant supervision       Comment  initial VC to back into car, but then performed rest of transfer w/o cueing              Gait Training      [place], Gait  distant supervision       Assistive Device  walker, front-wheeled       Distance in Feet  80 feet    add'l 150ft w/ (DS) w/o AD, no LOB      Gait Pattern Utilized  step-through              Stairs Training      [place], Stairs  distant supervision;verbal cues       Assistive Device  railing       Handrail Location  left side (ascending);right side (descending)       Number of Stairs  8       Ascending Stairs Technique  step-over-step       Descending Stairs Technique  step-to-step       Comment  good safety on stairs, no LOB, VC for sequencing              Balance      Balance Assessment  sitting static balance       Static Sitting Balance  WFL               Coping      Observed Emotional State  accepting       Verbalized Emotional State  acceptance              AM-PAC (TM) - Mobility (Current Function)      Turning from your back to your side while in a flat bed without using bedrails?  4 - None       Moving from lying on your back to sitting on the side of a flat bed without using bedrails?  4 - None       Moving to and from a bed to a chair?  4 - None       Standing up from a chair using your arms?  4 - None       To walk in a hospital room?  4 - None       Climbing 3-5 steps with a railing?  3 - A Little       AM-PAC (TM) Mobility Score  23              Therapy Assessment/Plan (PT)      Rehab Potential (PT)  good, to achieve stated therapy goals       Therapy Frequency (PT)  5-7 times/wk              Progress Summary (PT)      Daily Outcome Statement (PT)  Likely no PT needs at d/c, good support at home from wife, patient at (DS) level, and understands spinal precautions, wife to (A) at d/c.       Symptoms Noted During/After Treatment  shortness of breath    mild SOB (hx of COPD) w/ SpO2 96% after amb on RA             Therapy Plan Review/Discharge Plan (PT)      PT Recommended Discharge Disposition  home with assist       Anticipated Equipment Needs at Discharge (PT Eval)  none              Plan of Care Review      Plan of Care Reviewed With  patient                     Education provided this session. See the Patient Education summary report for full details.         PT Goals      Most Recent Value   Gait Training Goal 1   Activity/Assistive Device  gait (walking locomotion) at 06/27/2020 0927   [place]  independent at 06/27/2020 0927   Distance  250 at 06/27/2020 0927   Time Frame  by discharge at 06/27/2020 0927   Progress/Outcome  goal ongoing at 06/27/2020 0927   Stairs Goal 1   Activity/Assistive Device  stairs, all skills at 06/27/2020 0927   [place]  independent at 06/27/2020 0927   Number of Stairs  26 at 06/27/2020 0927   Time Frame  by  discharge at 06/27/2020 0927   Progress/Outcome  goal ongoing at 06/27/2020 0927

## 2020-06-27 NOTE — PLAN OF CARE
Problem: Adult Inpatient Plan of Care  Goal: Plan of Care Review  Outcome: Progressing  Flowsheets  Taken 6/27/2020 0455  Progress: improving  Outcome Summary: pt back from surgery around 1930. Hemovac drain with bloody drainage along w [place] drain. Anceph ATC and Vicodin PRN pain management. Ortega in place with clear yellow urine. Will continue to monitor.  Taken 6/26/2020 2009  Plan of Care Reviewed With: patient

## 2020-06-27 NOTE — PLAN OF CARE
Problem: Adult Inpatient Plan of Care  Goal: Plan of Care Review  Outcome: Progressing  Flowsheets (Taken 6/27/2020 1227)  Progress: improving  Plan of Care Reviewed With: patient  Outcome Summary: OT eval complete. Rec home with spouse when medically stable.

## 2020-06-27 NOTE — PLAN OF CARE
Problem: Adult Inpatient Plan of Care  Goal: Readiness for Transition of Care  Outcome: Progressing  Intervention: Mutually Develop Transition Plan  Flowsheets (Taken 6/27/2020 1400)  Anticipated Discharge Disposition: home with assistance; home with home health services  Equipment Needed After Discharge: none  Discharge Coordination/Progress: Patient lives home with his wife, independent, has a cane. Confirmed PCP Dr. Irving. Discharge plan home, agrees to home care. Wife will transport home.  Equipment Currently Used at Home: cane, straight  Anticipated Changes Related to Illness: none  Readmission Within the Last 30 Days: no previous admission in last 30 days  Patient/Family Anticipated Services at Transition: home health care  Patient/Family Anticipates Transition to: home with family; home with help/services  Transportation Anticipated: family or friend will provide  Concerns to be Addressed: basic needs  Patient's Choice of Community Agency(s): [facility]  Offered/Gave Vendor List: yes  Phoned [facility], they do not service patient's area.   [facility], they confirmed they cover patient's home area, able to start care before Tuesday. Referral sent via [employer].

## 2020-06-27 NOTE — NURSING NOTE
Clarified heparin order with orthopedic resident, Dr. Roel Ugalde. Per Dr. Ugalde, heparin okay to start POD #2 (6/28).

## 2020-06-27 NOTE — PROGRESS NOTES
Orthopaedic Spine Surgery Postoperative Check    [S]  DWIGHT. Pain well controlled. +Flatus.     [O]    Vitals:    06/27/20 0603   BP:    Pulse: 70   Resp: 18   Temp:    SpO2: 96%         Physical Exam    Dressing in place, clean, dry, intact. Small amount of serosanguinous drainage at drain site under dressing.  Drain: HV to gravity    RLE:  Inspection: No gross deformity. Skin in tact.   Neurovascular: Palpable DP pulse. SILT L1-S1 distibution.  Motor: IP 5/5, Quad 5/5, TA 5/5, EHL 5/5, GS 5/5    LLE:  Inspection: No gross deformity. Skin in tact.   Neurovascular: Palpable DP pulse. SILT L1-S1 distibution.  Motor: IP 5/5, Quad 5/5, TA 5/5, EHL 5/5, GS 5/5    AP: 69 y.o. male POD 1 s/p L1-S1 PLD    -- Pain control  -- WB: WBAT  -- DVT: SQH on POD2  -- Advance diet as tolerated  -- Monitor LUZMARIA drain output, set to gravity  -- d/c waters  -- PT/OT  -- Monitor neurovascular status  - CAREN dressing     Vipin Henderson MD

## 2020-06-27 NOTE — PROGRESS NOTES
Patient: Scottie Genao  Location: [facility] 5PAV 5434  MRN: [id number]  Today's date: 6/27/2020    Patient returned OOB seated in recliner chair with tray, hospital phone, call bell and personal items accessible. NAD. Chair alarm activated. RN aware.    Scottie is a 69 y.o. male admitted on 6/26/2020 with Spinal stenosis of lumbar region. Principal problem is No Principal Problem: There is no principal problem currently on the Problem List. Please update the Problem List and refresh..    Past Medical History  Scottie has a past medical history of Aortic valve disease, COPD (chronic obstructive pulmonary disease) (CMS/[facility]), Coronary artery disease, Diverticulitis of colon, Hypertension, Lipid disorder, Pulmonary arterial hypertension (CMS/[facility]), and Sleep apnea.    History of Present Illness  s/p L1-S1 PLD     OT Vitals    Date/Time Pulse SpO2 Pt Activity O2 Therapy BP BP Location BP Method Pt Position [facility]   06/27/20 0929 75 92 % At rest None (Room air) 117/66 Left upper arm Automatic Sitting AB      OT Pain    Date/Time Pain Type Pref Pain Scale Orientation Location Rating: Rest Interventions [facility]   06/27/20 0929 Pain Assessment number (Numeric Rating Pain Scale) lower back 6 position adjusted AB          Prior Living Environment      Most Recent Value   Living Arrangements  house   Living Environment Comment  lives with spouse in 2SH, 0STE, stall shower, bed/bath 3rd floor          Prior Level of Function      Most Recent Value   Ambulation  independent   Transferring  independent   Toileting  independent   Bathing  independent   Dressing  independent   Eating  independent   Prior Level of Function Comment  PTA, pt indep in ADLs and func mobility without AD, drives, retired   Equipment Currently Used at Home  cane, straight          OT Evaluation and Treatment - 06/27/20 0926        Time Calculation    Start Time  0926     Stop Time  0959     Time Calculation (min)  33 min        Session Details     Document Type  initial evaluation     Mode of Treatment  occupational therapy        General Information    Patient Profile Reviewed?  yes     Onset of Illness/Injury or Date of Surgery  06/26/20     Referring Physician  Liliane     General Observations of Patient  received in NAD, awake & alert, agreeable to OT session     Existing Precautions/Restrictions  fall;spinal        Occupational Profile    Reason for Services/Referral (Occupational Profile)  decline in ADLs     Occupational History/Life Experiences (Occupational Profile)  retired well drilling business        Cognition/Psychosocial    Affect/Mental Status (Cognitive)  WFL     Orientation Status (Cognition)  oriented x 4     Follows Commands (Cognition)  WFL     Cognitive Function (Cognitive)  WFL        Hearing Assessment    Hearing Status  WFL        Vision Assessment/Intervention    Visual Impairment/Limitations  corrective lenses full time        Sensory Assessment (Somatosensory)    Sensory Assessment (Somatosensory)  UE sensation intact        Range of Motion (ROM)    Range of Motion  ROM is WFL;bilateral upper extremities        Strength (Manual Muscle Testing)    Strength (Manual Muscle Testing)  strength is WFL;bilateral upper extremities        Bed Mobility    Comment (Bed Mobility)  pt received OOB        Transfers    Transfers  toilet transfer     Maintains Weight-Bearing Status (Transfers)  able to maintain weight-bearing status     Comment  maintains spinal precautions t/o session        Sit to Stand Transfer    [place], Sit to Stand Transfer  supervision     Assistive Device  walker, front-wheeled     Comment  maintains spinal precautions        Stand to Sit Transfer    [place], Stand to Sit Transfer  supervision     Assistive Device  none     Comment  maintains spinal precautions        Toilet Transfer    Transfer Technique  stand-sit;sit-stand     [place], Toilet Transfer  supervision     Assistive Device  none     Comment  no  AD, maintains spinal precautions        Balance    Balance Assessment  standing dynamic balance;sitting dynamic balance     Dynamic Sitting Balance  WFL     Dynamic Standing Balance  WFL     Comment, Balance  no overt LOB observed        Upper Body Dressing    Self-Performance  threads left arm, shirt;threads right arm, shirt     [place]  supervision     Position  unsupported standing     Comment  don/doff hospital gown        Lower Body Dressing    Self-Performance  threads left leg, underpants;threads right leg, underpants;pulls underpants up or down     [place]  minimum assist (75% or more patient effort)     Position  supported sitting;unsupported standing     Adaptive Equipment  reacher     Comment  SUP thread underwear using reacher seated in recliner chair. Min A to pull up underwear over hips standing. Reports wife able/willing to assist as needed. Owns reacher at home from previous surgery.        Grooming    Self-Performance  washes, rinses and dries hands;oral care (brushing teeth, cleaning dentures)     [place]  supervision     Position  sink side     Comment  SUP wash hands standing sink side. Mod I brush teeth seated in recliner chair after setup.        Toileting    [place]  supervision;perform bladder hygiene     Position  supported standing     Adaptive Equipment  urinal     Comment  SUP standing with urinal 2/2 requests from RN to measure.        AM-PAC (TM) - ADL (Current Function)    Putting on and taking off regular lower body clothing?  3 - A Little     Bathing?  3 - A Little     Toileting?  3 - A Little     Putting on/taking off regular upper body clothing?  3 - A Little     How much help for taking care of personal grooming?  3 - A Little     Eating meals?  4 - None     AM-PAC (TM) ADL Score  19        Therapy Assessment/Plan (OT)    Rehab Potential (OT)  good, to achieve stated therapy goals     Therapy Frequency (OT)  3-5 times/wk        Progress Summary (OT)    Daily  Outcome Statement (OT)  OT eval completed. Pt SUP sit<>stand and toilet transfers. Pt sup LB dress with reacher, required min A for clothing management standing. Pt reports wife able/willing to assist as needed. Rec DC home with spouse as assist when medically stable.        Therapy Plan Review/Discharge Plan (OT)    OT Recommended Discharge Disposition  home with assist     Anticipated Equipment Needs At Discharge (OT)  none                   Education provided this session. See the Patient Education summary report for full details.         OT Goals      Most Recent Value   Bed Mobility Goal 1   Activity/Assistive Device  bed mobility activities, all at 06/27/2020 0926   [place]  supervision required at 06/27/2020 0926   Time Frame  by discharge at 06/27/2020 0926   Progress/Outcome  goal ongoing at 06/27/2020 0926   Transfer Goal 1   Activity/Assistive Device  sit-to-stand/stand-to-sit, toilet at 06/27/2020 0926   [place]  modified independence at 06/27/2020 0926   Time Frame  by discharge at 06/27/2020 0926   Progress/Outcome  goal ongoing at 06/27/2020 0926   Dressing Goal 1   Activity/Adaptive Equipment  lower body dressing at 06/27/2020 0926   [place]  supervision required at 06/27/2020 0926   Time Frame  by discharge at 06/27/2020 0926   Progress/Outcome  goal ongoing at 06/27/2020 0926   Toileting Goal 1   Activity/Assistive Device  toileting skills, all at 06/27/2020 0926   [place]  supervision required at 06/27/2020 0926   Time Frame  by discharge at 06/27/2020 0926   Progress/Outcome  goal ongoing at 06/27/2020 0926

## 2020-06-27 NOTE — HOSPITAL COURSE
Scottie is a 69 y.o. male admitted on 6/26/2020 with Spinal stenosis of lumbar region. Principal problem is No Principal Problem: There is no principal problem currently on the Problem List. Please update the Problem List and refresh..    Past Medical History  Scottie has a past medical history of Aortic valve disease, COPD (chronic obstructive pulmonary disease) (CMS/HCC), Coronary artery disease, Diverticulitis of colon, Hypertension, Lipid disorder, Pulmonary arterial hypertension (CMS/HCC), and Sleep apnea.    History of Present Illness  s/p L1-S1 PLD

## 2020-06-28 PROBLEM — R51.9 HEADACHE: Status: ACTIVE | Noted: 2020-06-28

## 2020-06-28 LAB
ANION GAP SERPL CALC-SCNC: 5 MEQ/L (ref 3–15)
BUN SERPL-MCNC: 19 MG/DL (ref 8–20)
CALCIUM SERPL-MCNC: 8.3 MG/DL (ref 8.9–10.3)
CHLORIDE SERPL-SCNC: 104 MEQ/L (ref 98–109)
CO2 SERPL-SCNC: 26 MEQ/L (ref 22–32)
CREAT SERPL-MCNC: 1 MG/DL (ref 0.8–1.3)
ERYTHROCYTE [DISTWIDTH] IN BLOOD BY AUTOMATED COUNT: 12.6 % (ref 11.6–14.4)
GFR SERPL CREATININE-BSD FRML MDRD: >60 ML/MIN/1.73M*2
GLUCOSE SERPL-MCNC: 107 MG/DL (ref 70–99)
HCT VFR BLDCO AUTO: 36.3 % (ref 40.1–51)
HGB BLD-MCNC: 12.5 G/DL (ref 13.7–17.5)
MCH RBC QN AUTO: 32.9 PG (ref 28–33.2)
MCHC RBC AUTO-ENTMCNC: 34.4 G/DL (ref 32.2–36.5)
MCV RBC AUTO: 95.5 FL (ref 83–98)
PDW BLD AUTO: 8.7 FL (ref 9.4–12.4)
PLATELET # BLD AUTO: 148 K/UL (ref 150–350)
POTASSIUM SERPL-SCNC: 4.2 MEQ/L (ref 3.6–5.1)
RBC # BLD AUTO: 3.8 M/UL (ref 4.5–5.8)
SODIUM SERPL-SCNC: 135 MEQ/L (ref 136–144)
WBC # BLD AUTO: 8.88 K/UL (ref 3.8–10.5)

## 2020-06-28 PROCEDURE — 63600000 HC DRUGS/DETAIL CODE: Performed by: NURSE PRACTITIONER

## 2020-06-28 PROCEDURE — 36415 COLL VENOUS BLD VENIPUNCTURE: CPT | Performed by: NURSE PRACTITIONER

## 2020-06-28 PROCEDURE — 97530 THERAPEUTIC ACTIVITIES: CPT | Mod: GP

## 2020-06-28 PROCEDURE — 99232 SBSQ HOSP IP/OBS MODERATE 35: CPT | Performed by: HOSPITALIST

## 2020-06-28 PROCEDURE — 85027 COMPLETE CBC AUTOMATED: CPT | Performed by: NURSE PRACTITIONER

## 2020-06-28 PROCEDURE — 12000000 HC ROOM AND CARE MED/SURG

## 2020-06-28 PROCEDURE — 63700000 HC SELF-ADMINISTRABLE DRUG: Performed by: NURSE PRACTITIONER

## 2020-06-28 PROCEDURE — 80048 BASIC METABOLIC PNL TOTAL CA: CPT | Performed by: NURSE PRACTITIONER

## 2020-06-28 RX ADMIN — HEPARIN SODIUM 5000 UNITS: 5000 INJECTION, SOLUTION INTRAVENOUS; SUBCUTANEOUS at 21:11

## 2020-06-28 RX ADMIN — HYDROCODONE BITARTRATE AND ACETAMINOPHEN 2 TABLET: 5; 325 TABLET ORAL at 13:19

## 2020-06-28 RX ADMIN — CYCLOBENZAPRINE HYDROCHLORIDE 10 MG: 10 TABLET, FILM COATED ORAL at 08:01

## 2020-06-28 RX ADMIN — HYDROCODONE BITARTRATE AND ACETAMINOPHEN 2 TABLET: 5; 325 TABLET ORAL at 21:12

## 2020-06-28 RX ADMIN — ALBUTEROL SULFATE 2 PUFF: 90 AEROSOL, METERED RESPIRATORY (INHALATION) at 08:29

## 2020-06-28 RX ADMIN — RAMIPRIL 10 MG: 5 CAPSULE ORAL at 08:20

## 2020-06-28 RX ADMIN — HYDROCODONE BITARTRATE AND ACETAMINOPHEN 1 TABLET: 5; 325 TABLET ORAL at 08:01

## 2020-06-28 RX ADMIN — ATORVASTATIN CALCIUM 40 MG: 40 TABLET, FILM COATED ORAL at 21:12

## 2020-06-28 RX ADMIN — SENNOSIDES AND DOCUSATE SODIUM 1 TABLET: 8.6; 5 TABLET ORAL at 21:11

## 2020-06-28 RX ADMIN — CARVEDILOL 25 MG: 25 TABLET, FILM COATED ORAL at 16:58

## 2020-06-28 RX ADMIN — CYCLOBENZAPRINE HYDROCHLORIDE 10 MG: 10 TABLET, FILM COATED ORAL at 21:12

## 2020-06-28 RX ADMIN — HYDROCODONE BITARTRATE AND ACETAMINOPHEN 1 TABLET: 5; 325 TABLET ORAL at 07:10

## 2020-06-28 RX ADMIN — HEPARIN SODIUM 5000 UNITS: 5000 INJECTION, SOLUTION INTRAVENOUS; SUBCUTANEOUS at 13:19

## 2020-06-28 RX ADMIN — HEPARIN SODIUM 5000 UNITS: 5000 INJECTION, SOLUTION INTRAVENOUS; SUBCUTANEOUS at 06:51

## 2020-06-28 RX ADMIN — SENNOSIDES AND DOCUSATE SODIUM 1 TABLET: 8.6; 5 TABLET ORAL at 08:20

## 2020-06-28 RX ADMIN — CARVEDILOL 25 MG: 25 TABLET, FILM COATED ORAL at 08:20

## 2020-06-28 RX ADMIN — POLYETHYLENE GLYCOL 3350 17 G: 17 POWDER, FOR SOLUTION ORAL at 08:18

## 2020-06-28 RX ADMIN — BISACODYL 10 MG: 10 SUPPOSITORY RECTAL at 16:58

## 2020-06-28 RX ADMIN — ONDANSETRON 4 MG: 2 INJECTION INTRAMUSCULAR; INTRAVENOUS at 16:58

## 2020-06-28 ASSESSMENT — COGNITIVE AND FUNCTIONAL STATUS - GENERAL
CLIMB 3 TO 5 STEPS WITH RAILING: 2 - A LOT
AFFECT: WNL
MOVING TO AND FROM BED TO CHAIR: 3 - A LITTLE
WALKING IN HOSPITAL ROOM: 3 - A LITTLE
STANDING UP FROM CHAIR USING ARMS: 3 - A LITTLE

## 2020-06-28 NOTE — PLAN OF CARE
Problem: Adult Inpatient Plan of Care  Goal: Plan of Care Review  Outcome: Progressing  Flowsheets (Taken 6/28/2020 1220)  Progress: improving  Outcome Summary: PT f/u tx performed; pt requires Karla with all mobility due to pain and weakness.

## 2020-06-28 NOTE — NURSING NOTE
Pt reports severe headache 8/10 and nausea. PRN zofran and PRN suppository administered as ordered. Dr. George Henderson notified. No new orders received at this time. Will continue to monitor.

## 2020-06-28 NOTE — PROGRESS NOTES
Patient: Scottie Genao  Location: [facility] 5PAV 5434  MRN: [id number]  Today's date: 6/28/2020    Pt left sitting in recliner, call bell in reach; chair alarm activated; nsg informed of pt performance, status, and POC    Scottie is a 69 y.o. male admitted on 6/26/2020 with Spinal stenosis of lumbar region. Principal problem is No Principal Problem: There is no principal problem currently on the Problem List. Please update the Problem List and refresh..    Past Medical History  Scottie has a past medical history of Aortic valve disease, COPD (chronic obstructive pulmonary disease) (CMS/[facility]), Coronary artery disease, Diverticulitis of colon, Hypertension, Lipid disorder, Pulmonary arterial hypertension (CMS/[facility]), and Sleep apnea.    History of Present Illness  s/p L1-S1 PLD     PT Vitals    Date/Time Pulse HR Source BP BP Location BP Method Pt Position [facility]   06/28/20 1045 77 Monitor 125/66 Right upper arm Automatic Lying [facility]      PT Pain    Date/Time Pain Type Pref Pain Scale Location Rating: Rest Rating: Activity [facility]   06/28/20 1045 Pain Assessment -- back 5 8 [facility]   06/28/20 1055 Pain Assessment number (Numeric Rating Pain Scale) back 2 -- [facility]          Prior Living Environment      Most Recent Value   Living Arrangements  house   Living Environment Comment  lives with spouse in 2SH, 0STE, stall shower, bed/bath 3rd floor          Prior Level of Function      Most Recent Value   Ambulation  independent   Transferring  independent   Toileting  independent   Bathing  independent   Dressing  independent   Eating  independent   Prior Level of Function Comment  PTA, pt indep in ADLs and func mobility without AD, drives, retired   Equipment Currently Used at Home  cane, straight          PT Evaluation and Treatment - 06/28/20 1040        Time Calculation    Start Time  1040     Stop Time  1100     Time Calculation (min)  20 min        Session Details    Document Type  daily treatment/progress note     Mode of  Treatment  physical therapy        General Information    Patient Profile Reviewed?  yes     General Observations of Patient  pt received supine in bed with HOB slightly elevated     Existing Precautions/Restrictions  fall;spinal        Cognition/Psychosocial    Affect/Mental Status (Cognitive)  WNL     Orientation Status (Cognition)  oriented x 4        Bed Mobility    [place], Supine to Sit  minimum assist (75% or more patient effort)     Comment (Bed Mobility)  increased assistance due to pain and weakness        Sit to Stand Transfer    [place], Sit to Stand Transfer  minimum assist (75% or more patient effort);verbal cues     Verbal Cues  technique;safety;proper use of assistive device;hand placement     Assistive Device  walker, front-wheeled     Comment  assist due to weakness and pain; cues for hand placement        Stand to Sit Transfer    [place], Stand to Sit Transfer  minimum assist (75% or more patient effort);verbal cues     Verbal Cues  technique;safety;proper use of assistive device;hand placement     Assistive Device  walker, front-wheeled     Comment  cues for hands on arm rest        Gait Training    [place], Gait  minimum assist (75% or more patient effort);verbal cues     Assistive Device  walker, front-wheeled     Distance in Feet  25 feet     Gait Pattern Utilized  step-through     Deviations/Abnormal Patterns (Gait)  antalgic;gait speed decreased     Comment  cues to maintain self within RW for maximal supporit; cues to increase clearance during swing        Balance    Dynamic Standing Balance  mild impairment;unsupported        AM-PAC (TM) - Mobility (Current Function)    Turning from your back to your side while in a flat bed without using bedrails?  3 - A Little     Moving from lying on your back to sitting on the side of a flat bed without using bedrails?  3 - A Little     Moving to and from a bed to a chair?  3 - A Little     Standing up from a chair using your arms?   3 - A Little     To walk in a hospital room?  3 - A Little     Climbing 3-5 steps with a railing?  2 - A Lot     AM-PAC (TM) Mobility Score  17        Therapy Assessment/Plan (PT)    Rehab Potential (PT)  good, to achieve stated therapy goals     Therapy Frequency (PT)  daily        Progress Summary (PT)    Daily Outcome Statement (PT)  pt with increased pain levels, required increased levels of assistance with ambulation, transfers, and bed mobility; cues for technique to decrease pain and improve safety require.  pt will continue to benefit from skilled PT intervention to maximize functional mobility, decrease fall risk, and return to PLOF. pt did report decreased pain following session     Symptoms Noted During/After Treatment  fatigue        Therapy Plan Review/Discharge Plan (PT)    PT Recommended Discharge Disposition  skilled nursing facility     Anticipated Equipment Needs at Discharge (PT Eval)  none                       Education provided this session. See the Patient Education summary report for full details.

## 2020-06-28 NOTE — PLAN OF CARE
Problem: Adult Inpatient Plan of Care  Goal: Plan of Care Review  Outcome: Progressing  Flowsheets (Taken 6/28/2020 0804)  Progress: improving  Plan of Care Reviewed With: patient  Outcome Summary: Pt OOB with walker and supervision. Voiding. PRN norco for pain. HV to gravity and CAREN dressing in place.

## 2020-06-28 NOTE — ASSESSMENT & PLAN NOTE
Defer to surgery in regard to relation to recent procedure, but it is likely this is caffeine-withdrawal  D/w pt and will have some coffee

## 2020-06-28 NOTE — NURSING NOTE
0738: Pt c/o severe  headache 10/10 sitting up in chair, headache improved to 5/10 when reclined back in chair. Also reports nausea. Orthopedic resident, Dr. George Henderson notified via paged. Pt assisted back to bed.     0801: Dr. George Henderson at bedside to assess pt. Pt now reporting severe back pain, denies nausea. 130mL bloody drainage emptied from hemovac drain.  Orders received to administer 1 tablet Norco and 10mg flexeril (see MAR for documentation), notify Dr. Henderson if symptoms begin again. Will continue to monitor.

## 2020-06-28 NOTE — PROGRESS NOTES
Orthopaedic Spine Surgery Progress Note    [S]  DWIGHT. Pain well controlled. +Flatus.     [O]    Vitals:    06/28/20 0518   BP: 126/78   Pulse: 70   Resp: 16   Temp: 36.4 °C (97.5 °F)   SpO2: 96%         Physical Exam    Dressing in place, clean, dry, intact. Small amount of serosanguinous drainage at drain site under dressing.  Drain: HV to gravity    RLE:  Inspection: No gross deformity. Skin in tact.   Neurovascular: Palpable DP pulse. SILT L1-S1 distibution.  Motor: IP 5/5, Quad 5/5, TA 5/5, EHL 5/5, GS 5/5    LLE:  Inspection: No gross deformity. Skin in tact.   Neurovascular: Palpable DP pulse. SILT L1-S1 distibution.  Motor: IP 5/5, Quad 5/5, TA 5/5, EHL 5/5, GS 5/5    AP: 69 y.o. male POD 2 s/p L1-S1 PLD    -- Pain control  -- WB: WBAT  -- DVT: SQH on POD2  -- Advance diet as tolerated  -- Monitor LUZMARIA drain output, set to gravity  -- PT/OT  -- Monitor neurovascular status  - CAREN dressing     Storm Ugalde MD

## 2020-06-28 NOTE — NURSING NOTE
Crackles heard in L lower lung lobe during assessment, incentive spirometry encouraged & practiced with pt. Dr. Jorge notified. No new orders received. Will continue to monitor.

## 2020-06-28 NOTE — UM PHYSICIAN REVIEW NOTE
Inpatient appropriate for this patient requiring greater than two midnight stay for continued post op care, drain management after L1-S1 Decompression

## 2020-06-28 NOTE — PROGRESS NOTES
Hospital Medicine Service -  Daily Progress Note       SUBJECTIVE   Interval History: rough night with headache and back pain  Still with drain output, so disappointed about possibly not being let out today    Usually drinks a few coffees and pepsi throughout the day.  Has not had caffeine since arrival.    No BM since arrival.     OBJECTIVE      Vital signs in last 24 hours:  Temp:  [36.4 °C (97.5 °F)-37.1 °C (98.7 °F)] 36.8 °C (98.2 °F)  Heart Rate:  [66-76] 71  Resp:  [16-18] 16  BP: (107-152)/(58-89) 152/89    Intake/Output Summary (Last 24 hours) at 6/28/2020 1025  Last data filed at 6/28/2020 0755  Gross per 24 hour   Intake --   Output 1365 ml   Net -1365 ml       PHYSICAL EXAMINATION      Physical Exam   Comfortable  Anicteric  Neuro is grossly nonfocal  RRR  CTAB  abd slightly distended, NT, +BS,  No edema  Drain with sanguinous output     LINES, CATHETERS, DRAINS, AIRWAYS, AND WOUNDS   Lines, Drains, Airways, Wounds:  Drain Posterior Back 10 Fr. (Active)   Number of days: 2       Surgical Incision Back (Active)   Number of days: 2        LABS / IMAGING / TELE      Results from last 7 days   Lab Units 06/28/20  0516   WBC K/uL 8.88   HEMOGLOBIN g/dL 12.5*   HEMATOCRIT % 36.3*   PLATELETS K/uL 148*     Results from last 7 days   Lab Units 06/28/20  0516   SODIUM mEQ/L 135*   POTASSIUM mEQ/L 4.2   CHLORIDE mEQ/L 104   CO2 mEQ/L 26   BUN mg/dL 19   CREATININE mg/dL 1.0   GLUCOSE mg/dL 107*   CALCIUM mg/dL 8.3*         ASSESSMENT AND PLAN      * Spinal stenosis of lumbar region  Assessment & Plan  Pain control, DVT prophylaxis, weightbearing status, rehab per orthopedics        Headache  Assessment & Plan  Defer to surgery in regard to relation to recent procedure, but it is likely this is caffeine-withdrawal  D/w pt and will have some coffee    Alcohol use  Assessment & Plan  No in withdrawal    Tobacco abuse  Assessment & Plan  Encourage smoking cessation    Obstructive sleep apnea syndrome  Assessment &  Plan  With known pulm HTN  Continue CPAP    Dyslipidemia  Assessment & Plan  Continue statin    COPD (chronic obstructive pulmonary disease) (CMS/[facility])  Assessment & Plan  stable  Continue Trelegy and CPAP  Continue albuterol prn    Pulmonary hypertension (CMS/[facility])  Assessment & Plan        Essential hypertension  Assessment & Plan  Continue coreg, ramipril    Coronary artery disease involving native coronary artery of native heart without angina pectoris  Assessment & Plan  Continue BB, statin, ACE  Resume ASA when ok with surgeon              Keke Jorge DO  6/28/2020

## 2020-06-29 LAB
ANION GAP SERPL CALC-SCNC: 7 MEQ/L (ref 3–15)
BUN SERPL-MCNC: 15 MG/DL (ref 8–20)
CALCIUM SERPL-MCNC: 8.5 MG/DL (ref 8.9–10.3)
CHLORIDE SERPL-SCNC: 101 MEQ/L (ref 98–109)
CO2 SERPL-SCNC: 25 MEQ/L (ref 22–32)
CREAT SERPL-MCNC: 0.7 MG/DL (ref 0.8–1.3)
ERYTHROCYTE [DISTWIDTH] IN BLOOD BY AUTOMATED COUNT: 12.6 % (ref 11.6–14.4)
GFR SERPL CREATININE-BSD FRML MDRD: >60 ML/MIN/1.73M*2
GLUCOSE SERPL-MCNC: 101 MG/DL (ref 70–99)
HCT VFR BLDCO AUTO: 37.3 % (ref 40.1–51)
HGB BLD-MCNC: 12.5 G/DL (ref 13.7–17.5)
MCH RBC QN AUTO: 32.3 PG (ref 28–33.2)
MCHC RBC AUTO-ENTMCNC: 33.5 G/DL (ref 32.2–36.5)
MCV RBC AUTO: 96.4 FL (ref 83–98)
PDW BLD AUTO: 9 FL (ref 9.4–12.4)
PLATELET # BLD AUTO: 153 K/UL (ref 150–350)
POTASSIUM SERPL-SCNC: 4.1 MEQ/L (ref 3.6–5.1)
RBC # BLD AUTO: 3.87 M/UL (ref 4.5–5.8)
SODIUM SERPL-SCNC: 133 MEQ/L (ref 136–144)
WBC # BLD AUTO: 6.92 K/UL (ref 3.8–10.5)

## 2020-06-29 PROCEDURE — 97116 GAIT TRAINING THERAPY: CPT | Mod: GP

## 2020-06-29 PROCEDURE — 85027 COMPLETE CBC AUTOMATED: CPT | Performed by: NURSE PRACTITIONER

## 2020-06-29 PROCEDURE — 63600000 HC DRUGS/DETAIL CODE: Performed by: NURSE PRACTITIONER

## 2020-06-29 PROCEDURE — 36415 COLL VENOUS BLD VENIPUNCTURE: CPT | Performed by: NURSE PRACTITIONER

## 2020-06-29 PROCEDURE — 83735 ASSAY OF MAGNESIUM: CPT | Performed by: HOSPITALIST

## 2020-06-29 PROCEDURE — 63700000 HC SELF-ADMINISTRABLE DRUG: Performed by: NURSE PRACTITIONER

## 2020-06-29 PROCEDURE — 99232 SBSQ HOSP IP/OBS MODERATE 35: CPT | Performed by: HOSPITALIST

## 2020-06-29 PROCEDURE — 80048 BASIC METABOLIC PNL TOTAL CA: CPT | Performed by: NURSE PRACTITIONER

## 2020-06-29 PROCEDURE — 97535 SELF CARE MNGMENT TRAINING: CPT | Mod: GO

## 2020-06-29 PROCEDURE — 12000000 HC ROOM AND CARE MED/SURG

## 2020-06-29 RX ADMIN — SENNOSIDES AND DOCUSATE SODIUM 1 TABLET: 8.6; 5 TABLET ORAL at 08:36

## 2020-06-29 RX ADMIN — HYDROCODONE BITARTRATE AND ACETAMINOPHEN 2 TABLET: 5; 325 TABLET ORAL at 22:39

## 2020-06-29 RX ADMIN — HEPARIN SODIUM 5000 UNITS: 5000 INJECTION, SOLUTION INTRAVENOUS; SUBCUTANEOUS at 05:35

## 2020-06-29 RX ADMIN — RAMIPRIL 10 MG: 5 CAPSULE ORAL at 08:36

## 2020-06-29 RX ADMIN — HYDROCODONE BITARTRATE AND ACETAMINOPHEN 1 TABLET: 5; 325 TABLET ORAL at 18:34

## 2020-06-29 RX ADMIN — HEPARIN SODIUM 5000 UNITS: 5000 INJECTION, SOLUTION INTRAVENOUS; SUBCUTANEOUS at 21:54

## 2020-06-29 RX ADMIN — HYDROCODONE BITARTRATE AND ACETAMINOPHEN 2 TABLET: 5; 325 TABLET ORAL at 13:39

## 2020-06-29 RX ADMIN — HEPARIN SODIUM 5000 UNITS: 5000 INJECTION, SOLUTION INTRAVENOUS; SUBCUTANEOUS at 13:39

## 2020-06-29 RX ADMIN — CARVEDILOL 25 MG: 25 TABLET, FILM COATED ORAL at 08:36

## 2020-06-29 RX ADMIN — HYDROCODONE BITARTRATE AND ACETAMINOPHEN 2 TABLET: 5; 325 TABLET ORAL at 09:34

## 2020-06-29 RX ADMIN — HYDROCODONE BITARTRATE AND ACETAMINOPHEN 2 TABLET: 5; 325 TABLET ORAL at 05:36

## 2020-06-29 RX ADMIN — SENNOSIDES AND DOCUSATE SODIUM 1 TABLET: 8.6; 5 TABLET ORAL at 19:23

## 2020-06-29 RX ADMIN — CYCLOBENZAPRINE HYDROCHLORIDE 10 MG: 10 TABLET, FILM COATED ORAL at 05:40

## 2020-06-29 RX ADMIN — POLYETHYLENE GLYCOL 3350 17 G: 17 POWDER, FOR SOLUTION ORAL at 08:36

## 2020-06-29 RX ADMIN — ATORVASTATIN CALCIUM 40 MG: 40 TABLET, FILM COATED ORAL at 21:54

## 2020-06-29 RX ADMIN — HYDROCODONE BITARTRATE AND ACETAMINOPHEN 2 TABLET: 5; 325 TABLET ORAL at 01:23

## 2020-06-29 ASSESSMENT — COGNITIVE AND FUNCTIONAL STATUS - GENERAL
TOILETING: 4 - NONE
EATING MEALS: 4 - NONE
HELP NEEDED FOR PERSONAL GROOMING: 4 - NONE
STANDING UP FROM CHAIR USING ARMS: 4 - NONE
CLIMB 3 TO 5 STEPS WITH RAILING: 4 - NONE
DRESSING REGULAR LOWER BODY CLOTHING: 4 - NONE
AFFECT: WFL
WALKING IN HOSPITAL ROOM: 4 - NONE
HELP NEEDED FOR BATHING: 4 - NONE
DRESSING REGULAR UPPER BODY CLOTHING: 4 - NONE
MOVING TO AND FROM BED TO CHAIR: 4 - NONE
AFFECT: WFL

## 2020-06-29 NOTE — PROGRESS NOTES
Orthopaedic Spine Surgery Progress Note    [S]  [place]IGHT. Pain well controlled. +Flatus.     [O]    Vitals:    06/29/20 0400   BP: (!) 128/59   Pulse: 73   Resp: 16   Temp: 36.4 °C (97.5 °F)   SpO2: 94%         Physical Exam    Dressing in place, clean, dry, intact. Small amount of serosanguinous drainage at drain site under dressing.  Drain: HV to gravity    RLE:  Inspection: No gross deformity. Skin in tact.   Neurovascular: Palpable DP pulse. SILT L1-S1 distibution.  Motor: IP 5/5, Quad 5/5, TA 5/5, EHL 5/5, GS 5/5    LLE:  Inspection: No gross deformity. Skin in tact.   Neurovascular: Palpable DP pulse. SILT L1-S1 distibution.  Motor: IP 5/5, Quad 5/5, TA 5/5, EHL 5/5, GS 5/5    AP: 69 y.o. male POD 3 s/p L1-S1 PLD    -- Pain control  -- WB: WBAT  -- DVT: SQH   -- Advance diet as tolerated  -- Monitor LUZMARIA drain output, set to gravity  -- PT/OT  -- Monitor neurovascular status  - CAREN dressing     Vipin Henderson MD

## 2020-06-29 NOTE — PLAN OF CARE
Problem: Adult Inpatient Plan of Care  Goal: Plan of Care Review  Outcome: Progressing  Flowsheets (Taken 6/29/2020 8857)  Progress: improving  Plan of Care Reviewed With: patient  Outcome Summary: Patient is supervision oob with rolling walker.  Taking 2 norco PRN for pain control.  Hemovac to gravity and clamped when ambulating.  Plan for DC home tomorrow

## 2020-06-29 NOTE — PLAN OF CARE
Problem: Adult Inpatient Plan of Care  Goal: Plan of Care Review  Outcome: Progressing  Flowsheets (Taken 6/29/2020 0427)  Progress: improving  Plan of Care Reviewed With: patient  Outcome Summary: Pt OOB with RW and supervision. HV uncompressed to gravity. PRN norco given for pain.

## 2020-06-29 NOTE — PLAN OF CARE
Problem: Adult Inpatient Plan of Care  Goal: Plan of Care Review  Outcome: Progressing  Flowsheets (Taken 6/29/2020 0933)  Progress: improving  Plan of Care Reviewed With: patient  Outcome Summary: OT session complete, pt has met all acute OT goals cleared for home from OT standpoint with family assist

## 2020-06-29 NOTE — PROGRESS NOTES
Patient: Scottie Genao  Location: [facility] 5PAV 5434  MRN: [id number]  Today's date: 6/29/2020     Patient returned to seated position in recliner, NAD  Chair alarm activated  All personal needs within reach  Nsg notified of pt's performance       Scottie is a 69 y.o. male admitted on 6/26/2020 with Spinal stenosis of lumbar region. Principal problem is No Principal Problem: There is no principal problem currently on the Problem List. Please update the Problem List and refresh..    Past Medical History  Scottie has a past medical history of Aortic valve disease, COPD (chronic obstructive pulmonary disease) (CMS/[facility]), Coronary artery disease, Diverticulitis of colon, Hypertension, Lipid disorder, Pulmonary arterial hypertension (CMS/[facility]), and Sleep apnea.    History of Present Illness  s/p L1-S1 PLD     PT Vitals    Date/Time Pulse SpO2 Pt Activity O2 Therapy BP BP Method Pt Position [facility]   06/29/20 0820 73 95 % At rest None (Room air) 150/76 Automatic Sitting LA      PT Pain    Date/Time Pain Type Pref Pain Scale Location Rating: Rest Rating: Activity Interventions [facility]   06/29/20 0820 Pain Assessment number (Numeric Rating Pain Scale) back 4 5 position adjusted LA          Prior Living Environment      Most Recent Value   Living Arrangements  house   Living Environment Comment  lives with spouse in 2SH, 0STE, stall shower, bed/bath 3rd floor          Prior Level of Function      Most Recent Value   Ambulation  independent   Transferring  independent   Toileting  independent   Bathing  independent   Dressing  independent   Eating  independent   Prior Level of Function Comment  PTA, pt indep in ADLs and func mobility without AD, drives, retired   Equipment Currently Used at Home  cane, straight          PT Evaluation and Treatment - 06/29/20 0820        Time Calculation    Start Time  0820     Stop Time  0834     Time Calculation (min)  14 min        Session Details    Document Type  daily  treatment/progress note     Mode of Treatment  physical therapy        General Information    Patient Profile Reviewed?  yes     Onset of Illness/Injury or Date of Surgery  06/26/20     General Observations of Patient  Pt rec supine in bed, awake and agreeable to therapy     Existing Precautions/Restrictions  fall;spinal;other (see comments)    no brace in order set, drain clamped when amb       Cognition/Psychosocial    Affect/Mental Status (Cognitive)  WFL     Orientation Status (Cognition)  oriented x 4     Follows Commands (Cognition)  WFL     Cognitive Function (Cognitive)  WFL     Comment, Cognition  pt very pleasant, good adherence to spinal precautions        Bed Mobility    Bed Mobility  other (see comments)     Comment (Bed Mobility)  please refer to OT's note        Transfers    Transfers  other (see comments)     Maintains Weight-Bearing Status (Transfers)  able to maintain weight-bearing status        Sit to Stand Transfer    [place], Sit to Stand Transfer  modified independence     Assistive Device  walker, front-wheeled     Comment  no safety awareness and body mech        Stand to Sit Transfer    [place], Stand to Sit Transfer  modified independence     Assistive Device  walker, front-wheeled     Comment  good eccentric control        Gait Training    [place], Gait  modified independence     Assistive Device  walker, front-wheeled     Distance in Feet  300 feet     Gait Pattern Utilized  step-through     Deviations/Abnormal Patterns (Gait)  step length decreased;stride length decreased;antalgic     Comment  good steady gait, no overt LOB, pt reports minimal inc pain        Stairs Training    [place], Stairs  other (see comments)     Comment  pt performed on 6/27- reports no difficulty or concerns performing at home- deferred         Balance    Balance Assessment  sitting static balance;sit to stand dynamic balance     Static Sitting Balance  WFL;sitting, edge of bed     Sit to  Stand Dynamic Balance  WFL;supported        AM-PAC (TM) - Mobility (Current Function)    Turning from your back to your side while in a flat bed without using bedrails?  4 - None     Moving from lying on your back to sitting on the side of a flat bed without using bedrails?  4 - None     Moving to and from a bed to a chair?  4 - None     Standing up from a chair using your arms?  4 - None     To walk in a hospital room?  4 - None     Climbing 3-5 steps with a railing?  4 - None     AM-PAC (TM) Mobility Score  24        Therapy Assessment/Plan (PT)    Criteria for Skilled Interventions Met (PT)  no problems identified which require skilled intervention        Progress Summary (PT)    Daily Outcome Statement (PT)  6/29 Pt seen for follow up PT session; pt amb well- completes all txs without assist, good safety awareness and insight into spinal precautions- pt achieved therapy goals, no further skilled PT needs identified     Symptoms Noted During/After Treatment  none        Therapy Plan Review/Discharge Plan (PT)    PT Recommended Discharge Disposition  home with assist     Anticipated Equipment Needs at Discharge (PT Eval)  walker, front-wheeled        Plan of Care Review    Plan of Care Reviewed With  patient                  Equipment Provided: Walker, with Wheels, Adult   Vendor: [employer]    Education provided this session. See the Patient Education summary report for full details.

## 2020-06-29 NOTE — PLAN OF CARE
Problem: Adult Inpatient Plan of Care  Goal: Plan of Care Review  Outcome: Progressing  Flowsheets (Taken 6/29/2020 0952)  Plan of Care Reviewed With: patient  Outcome Summary: PT session completed: 6/29 Pt seen for follow up PT session; pt amb well- completes all txs without assist, good safety awareness and insight into spinal precautions- pt achieved therapy goals, no further skilled PT needs identified

## 2020-06-29 NOTE — PROGRESS NOTES
Hospital Medicine Service -  Daily Progress Note       SUBJECTIVE   Interval History: Pt seen and examined. Denies any CP or SOB. Reports his headache resolved with coffee.     OBJECTIVE      Vital signs in last 24 hours:  Temp:  [36.1 °C (97 °F)-36.9 °C (98.4 °F)] 36.3 °C (97.4 °F)  Heart Rate:  [62-77] 73  Resp:  [16-18] 16  BP: ()/(54-76) 150/76    Intake/Output Summary (Last 24 hours) at 6/29/2020 1001  Last data filed at 6/29/2020 0536  Gross per 24 hour   Intake --   Output 285 ml   Net -285 ml       PHYSICAL EXAMINATION      Physical Exam   Constitutional: He is oriented to person, place, and time. No distress.   HENT:   Head: Normocephalic and atraumatic.   Eyes: Conjunctivae are normal.   Neck: Neck supple.   Cardiovascular: Normal rate, regular rhythm and normal heart sounds.   Pulmonary/Chest: Effort normal and breath sounds normal.   Abdominal: Soft. Bowel sounds are normal. He exhibits no distension. There is no tenderness. There is no guarding.   Musculoskeletal: He exhibits no edema.   Lower back drain in place   Neurological: He is alert and oriented to person, place, and time.   Skin: Skin is warm and dry.   Psychiatric: He has a normal mood and affect.      LINES, CATHETERS, DRAINS, AIRWAYS, AND WOUNDS   Lines, Drains, Airways, Wounds:  Peripheral IV 06/28/20 Anterior;Proximal;Right Forearm (Active)   Number of days: 1       Drain Posterior Back 10 Fr. (Active)   Number of days: 3       Surgical Incision Back (Active)   Number of days: 3       Comments:      LABS / IMAGING / TELE      Labs  Reviewed  Results from last 7 days   Lab Units 06/29/20  0415   WBC K/uL 6.92   HEMOGLOBIN g/dL 12.5*   HEMATOCRIT % 37.3*   PLATELETS K/uL 153     Results from last 7 days   Lab Units 06/29/20  0415   SODIUM mEQ/L 133*   POTASSIUM mEQ/L 4.1   CHLORIDE mEQ/L 101   CO2 mEQ/L 25   BUN mg/dL 15   CREATININE mg/dL 0.7*   GLUCOSE mg/dL 101*   CALCIUM mg/dL 8.5*        Imaging  n/a    ECG/Telemetry  n/a    ASSESSMENT AND PLAN      Headache  Assessment & Plan  Resolved this am  likely this is from caffeine-withdrawal    Alcohol use  Assessment & Plan  No withdrawal symptoms currently    Tobacco abuse  Assessment & Plan  Encouraged smoking cessation    Obstructive sleep apnea syndrome  Assessment & Plan  With known pulm HTN  Continue CPAP    Dyslipidemia  Assessment & Plan  Continue statin    Essential hypertension  Assessment & Plan  Continue coreg, ramipril    COPD (chronic obstructive pulmonary disease) (CMS/[facility])  Assessment & Plan  Stable, no acute symptoms  Continue Trelegy and CPAP  Continue albuterol prn    Coronary artery disease involving native coronary artery of native heart without angina pectoris  Assessment & Plan  Continue BB, statin, ACE  Resume ASA when ok with surgeon           VTE Assessment: [place]    VTE Prophylaxis Plan: hep SQ  Code Status: Full Code  Estimated Discharge Date: 6/28/2020  Disposition Planning: per ortho team     Fran Kaplan MD  6/29/2020

## 2020-06-29 NOTE — PATIENT CARE CONFERENCE
Care Progression Rounds Note  Date: 6/29/2020  Time: 10:58 AM     Patient Name: Scottie Genao     Medical Record Number: [id number]   YOB: 1950  Sex: Male      Room/Bed: 5434    Admitting Diagnosis: Spinal stenosis, lumbar region, with neurogenic claudication [M48.062]  Spinal stenosis of lumbar region [M48.061]   Admit Date/Time: 6/26/2020  8:08 AM    Primary Diagnosis: Spinal stenosis of lumbar region  Principal Problem: Spinal stenosis of lumbar region    GMLOS: 2.2  Anticipated Discharge Date: 6/30/2020    AM-PAC  Mobility Score: 24    Discharge Planning:  Living Arrangements: house  Anticipated Discharge Disposition: home with assistance, home with home health services    Barriers to Discharge:  Barriers to Discharge: Medical issues not resolved(monitoring drain output)    Participants:  advanced practice provider, , nursing, physical therapy, social work/services

## 2020-06-29 NOTE — PROGRESS NOTES
Patient: Scottie Genao  Location: [facility] 5PAV 5434  MRN: [id number]  Today's date: 6/29/2020     Pt seated in bedside chair at end of OT session, NAD. Call bell and phone in reach, chair alarm activated and nursing notified.       Scottie is a 69 y.o. male admitted on 6/26/2020 with Spinal stenosis of lumbar region. Principal problem is No Principal Problem: There is no principal problem currently on the Problem List. Please update the Problem List and refresh..    Past Medical History  Scottie has a past medical history of Aortic valve disease, COPD (chronic obstructive pulmonary disease) (CMS/[facility]), Coronary artery disease, Diverticulitis of colon, Hypertension, Lipid disorder, Pulmonary arterial hypertension (CMS/[facility]), and Sleep apnea.    History of Present Illness  s/p L1-S1 PLD     OT Vitals    Date/Time Pulse SpO2 Pt Activity O2 Therapy BP BP Location BP Method Pt Position [facility]   06/29/20 0805 73 95 % At rest None (Room air) 150/76 Left upper arm Automatic Sitting GJG      OT Pain    Date/Time Pain Type Pref Pain Scale Location Rating: Rest Rating: Activity Interventions [facility]   06/29/20 0805 Pain Assessment number (Numeric Rating Pain Scale) back 4 5 position adjusted GJG          Prior Living Environment      Most Recent Value   Living Arrangements  house   Living Environment Comment  lives with spouse in 2SH, 0STE, stall shower, bed/bath 3rd floor          Prior Level of Function      Most Recent Value   Ambulation  independent   Transferring  independent   Toileting  independent   Bathing  independent   Dressing  independent   Eating  independent   Prior Level of Function Comment  PTA, pt indep in ADLs and func mobility without AD, drives, retired   Equipment Currently Used at Home  cane, straight          OT Evaluation and Treatment - 06/29/20 0805        Time Calculation    Start Time  0805     Stop Time  0821     Time Calculation (min)  16 min        Session Details    Document Type  daily  treatment/progress note     Mode of Treatment  occupational therapy        General Information    Patient Profile Reviewed?  yes     Onset of Illness/Injury or Date of Surgery  06/26/20     General Observations of Patient  pt in supine agreeable to OT      Existing Precautions/Restrictions  spinal        Cognition/Psychosocial    Affect/Mental Status (Cognitive)  WFL     Orientation Status (Cognition)  oriented x 4     Cognitive Function (Cognitive)  WFL     Comment, Cognition  recalled 3/3 BLT spinal precautions         Bed Mobility    Bed Mobility  bed mobility (all) activities     [place], All Bed Mobility Activities  independent     Comment (Bed Mobility)  completed bed mobility with spinal precs using log roll technique         Transfers    Transfers  toilet transfer;shower transfer        Sit to Stand Transfer    [place], Sit to Stand Transfer  modified independence     Assistive Device  walker, front-wheeled        Stand to Sit Transfer    [place], Stand to Sit Transfer  modified independence     Assistive Device  walker, front-wheeled        Toilet Transfer    Transfer Technique  sit-stand;stand-sit     [place], Toilet Transfer  modified independence     Assistive Device  grab bars/safety frame;walker, front-wheeled     Comment  completed simulated home toilet transfer with mod i.         Shower Transfer    Transfer Technique  step over, right entry;step over, left entry     [place], Shower Transfer  modified independence     Assistive Device  walker, front-wheeled     Comment  completed simulated home shower transfer with mod I and spinal precs followed         Balance    Comment, Balance  no LOB observed        Bathing    Comment  provided with education on adaptive strategies and equipment like long handle sponge to promote independence and adherence to spinal precs with bathing tasks - pt verbalized understanding         Lower Body Dressing    Comment  educated on cross legged  technique and potential for adaptive equipment with LB dressing to promote independence, safety and spinal precs. pt reports his spouse can assist him at home with LB dressing         Toileting    Comment  pt provided with education and demonstration of adaptive strategies for toileting to promote safety, independence and adherence to spinal precautions, pt verbalized understanding of education provided         AM-PAC (TM) - ADL (Current Function)    Putting on and taking off regular lower body clothing?  4 - None     Bathing?  4 - None     Toileting?  4 - None     Putting on/taking off regular upper body clothing?  4 - None     How much help for taking care of personal grooming?  4 - None     Eating meals?  4 - None     AM-PAC (TM) ADL Score  24        Therapy Assessment/Plan (OT)    Rehab Potential (OT)  --    d/c OT     Therapy Frequency (OT)  --    d/c OT        Progress Summary (OT)    Daily Outcome Statement (OT)  OT session complete, pt has met all acute OT goals. he was independent in ADL and relevant transfer practice. he verbalized spinal precs and application to ADL and relevant transfers. cleared for home with family assist from OT standpoint         Therapy Plan Review/Discharge Plan (OT)    OT Recommended Discharge Disposition  home with assist     Anticipated Equipment Needs At Discharge (OT)  bathing equipment                   Education provided this session. See the Patient Education summary report for full details.         OT Goals      Most Recent Value   Bed Mobility Goal 1   Activity/Assistive Device  bed mobility activities, all at 06/27/2020 0926   [place]  supervision required at 06/27/2020 0926   Time Frame  by discharge at 06/27/2020 0926   Progress/Outcome  goal met at 06/29/2020 0805   Transfer Goal 1   Activity/Assistive Device  sit-to-stand/stand-to-sit, toilet at 06/27/2020 0926   [place]  modified independence at 06/27/2020 0926   Time Frame  by discharge at 06/27/2020 0926    Progress/Outcome  goal met at 06/29/2020 0805   Dressing Goal 1   Activity/Adaptive Equipment  lower body dressing at 06/27/2020 0926   [place]  supervision required at 06/27/2020 0926   Time Frame  by discharge at 06/27/2020 0926   Progress/Outcome  goal met at 06/29/2020 0805   Toileting Goal 1   Activity/Assistive Device  toileting skills, all at 06/27/2020 0926   [place]  supervision required at 06/27/2020 0926   Time Frame  by discharge at 06/27/2020 0926   Progress/Outcome  goal met at 06/29/2020 0805

## 2020-06-29 NOTE — PROGRESS NOTES
Orthopaedic Spine Surgery Progress Note    [S]  DWIGHT. Pain well controlled. +Flatus.     [O]    Vitals:    06/29/20 1100   BP: (!) 92/51   Pulse: 70   Resp: 18   Temp: 36.8 °C (98.2 °F)   SpO2: 96%         Physical Exam    Dressing in place, clean, dry, intact. Small amount of serosanguinous drainage at drain site under dressing.  Drain: HV to gravity    RLE:  Inspection: No gross deformity. Skin in tact.   Neurovascular: Palpable DP pulse. SILT L1-S1 distibution.  Motor: IP 5/5, Quad 5/5, TA 5/5, EHL 5/5, GS 5/5    LLE:  Inspection: No gross deformity. Skin in tact.   Neurovascular: Palpable DP pulse. SILT L1-S1 distibution.  Motor: IP 5/5, Quad 5/5, TA 5/5, EHL 5/5, GS 5/5    AP: 69 y.o. male POD 3 s/p L1-S1 PLD    -- Pain control  -- WB: WBAT  -- DVT: SQH   -- Advance diet as tolerated  -- Monitor LUZMARIA drain output, set to gravity. Drain clamps when OOB.  -- PT/OT  -- Monitor neurovascular status  - CAREN dressing     Vipin Henderson MD

## 2020-06-29 NOTE — ASSESSMENT & PLAN NOTE
Emergency Department    [address] [facility] [address]    Phone:  [phone]    Fax:  [phone]                                       Antonio Vargas   MRN: [id number]    Department:   Emergency Department   Date of Visit:  7/23/2017           Patient Information     Date Of Birth          1993        Your diagnoses for this visit were:     Viral URI with cough     Pharyngitis, unspecified etiology        You were seen by Helena Alvarez PA-C.      Follow-up Information     Follow up with  Emergency Department.    Specialty:  EMERGENCY MEDICINE    Why:  If symptoms worsen    Contact information:    [address] [facility] [id number] [phone]        Follow up with [facility] In 3 days.    Specialties:  Podiatry, Internal Medicine, Family Medicine    Why:  if not improving    Contact information:    [address] [id number] [phone]        Discharge Instructions       Discharge Instructions  Upper Respiratory Infection    The upper respiratory tract includes the sinuses, nasal passages, pharynx, and larynx. A URI, or upper respiratory infection, is an infection of any of the parts of the upper airway. Symptoms include runny nose, congestion, sore throat, cough, and fever. URIs are almost always caused by a virus. Antibiotics do not help with virus infections, so are not used for an ordinary URI. A URI is very contagious through coughing and nasal secretions; make sure you wash your hands often and clean surfaces after sneezing, coughing or touching them.  Viruses can live on surfaces for up to 3 days.      Return to the Emergency Department if:    Any of the symptoms you have get much worse.    You seem very sick, like being too weak to get up.    You have any new symptoms, especially serious things like chest pain.     You are short of breath.     You have a severe headache.    You are vomiting so  No withdrawal symptoms currently   much you can t keep fluids or medicines down.    You have confusion or seem unusually drowsy.    You have a seizure or convulsion.    Follow-up:      You should start to improve in 3 - 5 days.  A cough can linger for up to six weeks, but overall you should be feeling much better.  See your doctor if you have a fever for more than 3 days, or if you are not feeling better within 5 days.      What can I do to help myself?    Fill any prescriptions the doctor gave you and take them right away    If you have a fever, get plenty of rest and drink lots of fluids, especially water. Using a humidifier or saline nose spray will also help loosen secretions.     What clothes or blankets you have on won t change your fever. Do what is comfortable for you.    Bathing or sponging in lukewarm water may help you feel better.    Tylenol  (acetaminophen), Motrin  (ibuprofen), or Advil  (ibuprofen) help bring fever down and may help you feel more comfortable. Be sure to read and follow the package directions, and ask your doctor if you have questions.    Do not drink alcohol.    Decongestants may help you feel better. You may use decongestant nose sprays Afrin  (oxymetazoline) or Geronimo-Synephrine  (phenylephrine hydrochloride) for up to 3 days, or may use a decongestant tablet like Sudafed  (pseudoephedrine).  If you were given a prescription for medicine here today, be sure to read all of the information (including the package insert) that comes with your prescription.  This will include important information about the medicine, its side effects, and any warnings that you need to know about.  The pharmacist who fills the prescription can provide more information and answer questions you may have about the medicine.  If you have questions or concerns that the pharmacist cannot address, please call or return to the Emergency Department.       Remember that you can always come back to the Emergency Department if you are not able to see  your regular doctor in the amount of time listed above, if you get any new symptoms, or if there is anything that worries you.    Discharge Instructions  Sore Throat  You were seen today for a sore throat.  Most sore throats are caused by a virus. Antibiotics do not help with viral infections, but you can fight off the virus on your own.  In this case, your sore throat would be treated with medications for your pain and fever.    Strep throat is a kind of sore throat caused by Group A streptococcus bacteria.  This type of sore throat is treated with antibiotics.  If you had a rapid test done today for strep throat and it did not show infection, we always do a culture. If the culture shows you have strep throat, we will call you and get you a prescription for antibiotics.    Return to the Emergency Department if:    If you have difficulty breathing.    If you are drooling because you are unable to swallow.    You become dehydrated due to difficulty drinking. Signs of dehydration include weakness, dry mouth, and urinating less than 3 times per day.    If you develop swelling of the neck or tongue.    If you develop a high fever with either headache or stiff neck.    Treatment:      Pain relief -- Non-prescription pain medications, such as Tylenol  (acetaminophen) or Motrin , Advil  (ibuprofen) are usually recommended for pain.  Do not use a medicine that you are allergic to, or if your doctor has told you not to use it.   If you have been given a narcotic such as Vicodin  (hydrocodone with acetaminophen), Percocet  (oxycodone with acetaminophen), codeine, do not drive for four hours after you have taken it.  If the narcotic contains Tylenol  (acetaminophen), do not take Tylenol  with it. All narcotics will cause constipation, so eat a high fiber diet.      If you have been placed on antibiotics, watch for signs of allergic reaction.  These include rash, lip swelling, difficulty breathing, wheezing, and dizziness.  If  "you develop any of these symptoms, stop the antibiotic immediately and go to an Emergency Department or Urgent Care for evaluation.    Probiotics: If you have been given an antibiotic, you may want to also take a probiotic pill or eat yogurt with live cultures. Probiotics have \"good bacteria\" to help your intestines stay healthy. Studies have shown that probiotics help prevent diarrhea and other intestine problems (including C. diff infection) when you take antibiotics. You can buy these without a prescription in the pharmacy section of the store.   If you were given a prescription for medicine here today, be sure to read all of the information (including the package insert) that comes with your prescription.  This will include important information about the medicine, its side effects, and any warnings that you need to know about.  The pharmacist who fills the prescription can provide more information and answer questions you may have about the medicine.  If you have questions or concerns that the pharmacist cannot address, please call or return to the Emergency Department.             Remember that you can always come back to the Emergency Department if you are not able to see your regular doctor in the amount of time listed above, if you get any new symptoms, or if there is anything that worries you.            24 Hour Appointment Hotline       To make an appointment at any [facility], call [phone] ([phone]). If you don't have a family doctor or clinic, we will help you find one. [facility] are conveniently located to serve the needs of you and your family.             Review of your medicines      START taking        Dose / Directions Last dose taken    guaiFENesin-codeine 100-10 MG/5ML Soln solution   Commonly known as:  ROBITUSSIN AC   Dose:  1 tsp.   Quantity:  120 mL        Take 5 mLs by mouth every 4 hours as needed for cough   Refills:  0                Prescriptions were sent or " printed at these locations (1 Prescription)                   Other Prescriptions                Printed at Department/Unit printer (1 of 1)         guaiFENesin-codeine (ROBITUSSIN AC) 100-10 MG/5ML SOLN solution                Procedures and tests performed during your visit     Beta strep group A culture    Chest XR,  PA & LAT    Rapid strep screen      Orders Needing Specimen Collection     None      Pending Results     Date and Time Order Name Status Description    7/23/2017 1649 Beta strep group A culture In process             Pending Culture Results     Date and Time Order Name Status Description    7/23/2017 1649 Beta strep group A culture In process             Pending Results Instructions     If you had any lab results that were not finalized at the time of your Discharge, you can call the ED Lab Result RN at [phone]. You will be contacted by this team for any positive Lab results or changes in treatment. The nurses are available 7 days a week from 10A to 6:30P.  You can leave a message 24 hours per day and they will return your call.        Test Results From Your Hospital Stay        7/23/2017  5:06 PM      Narrative     XR CHEST 2 VW   7/23/2017 5:03 PM     HISTORY: Cough.    COMPARISON: None.        Impression     IMPRESSION: Normal.    JULIAN MOSQUEDA MD         7/23/2017  5:05 PM      Component Results     Component    Specimen Description    Throat    Rapid Strep A Screen    NEGATIVE: No Group A streptococcal antigen detected by immunoassay, await   culture report.      Micro Report Status    FINAL 07/23/2017 7/23/2017  5:06 PM                Clinical Quality Measure: Blood Pressure Screening     Your blood pressure was checked while you were in the emergency department today. The last reading we obtained was  BP: 134/75 . Please read the guidelines below about what these numbers mean and what you should do about them.  If your systolic blood pressure (the top number) is less than 120 and  "your diastolic blood pressure (the bottom number) is less than 80, then your blood pressure is normal. There is nothing more that you need to do about it.  If your systolic blood pressure (the top number) is 120-139 or your diastolic blood pressure (the bottom number) is 80-89, your blood pressure may be higher than it should be. You should have your blood pressure rechecked within a year by a primary care provider.  If your systolic blood pressure (the top number) is 140 or greater or your diastolic blood pressure (the bottom number) is 90 or greater, you may have high blood pressure. High blood pressure is treatable, but if left untreated over time it can put you at risk for heart attack, stroke, or kidney failure. You should have your blood pressure rechecked by a primary care provider within the next 4 weeks.  If your provider in the emergency department today gave you specific instructions to follow-up with your doctor or provider even sooner than that, you should follow that instruction and not wait for up to 4 weeks for your follow-up visit.        Thank you for choosing [facility]       Thank you for choosing [facility] for your care. Our goal is always to provide you with excellent care. Hearing back from our patients is one way we can continue to improve our services. Please take a few minutes to complete the written survey that you may receive in the mail after you visit with us. Thank you!        [employer] Information     [employer] lets you send messages to your doctor, view your test results, renew your prescriptions, schedule appointments and more. To sign up, go to [email or web address] . Click on \"Log in\" on the left side of the screen, which will take you to the Welcome page. Then click on \"Sign up Now\" on the right side of the page.     You will be asked to enter the access code listed below, as well as some personal information. Please follow the directions to create your username and password.   "   Your access code is: [email or web address]  Expires: 10/21/2017  5:17 PM     Your access code will  in 90 days. If you need help or a new code, please call your [facility] clinic or [phone].        Care EveryWhere ID     This is your Care EveryWhere ID. This could be used by other organizations to access your [facility] medical records  [id number]        Equal Access to Services     DANIEL MA : Martinez hernandez Sobrendan, waaxda alvaro, qaybta kaalmada adesouleymane, pierre deluca . So [facility] [phone].    ATENCIÓN: Si habla español, tiene a harding disposición servicios gratuitos de asistencia lingüística. Llame al [phone].    We comply with applicable federal civil rights laws and [place] laws. We do not discriminate on the basis of race, color, national origin, age, disability sex, sexual orientation or gender identity.            After Visit Summary       This is your record. Keep this with you and show to your community pharmacist(s) and doctor(s) at your next visit.

## 2020-06-29 NOTE — PROGRESS NOTES
Per discharge planning rounds discharge planned for tomorrow. Will fax Discharge instructions to [facility] when completed.

## 2020-06-30 VITALS
DIASTOLIC BLOOD PRESSURE: 55 MMHG | TEMPERATURE: 97 F | HEART RATE: 74 BPM | BODY MASS INDEX: 36 KG/M2 | SYSTOLIC BLOOD PRESSURE: 103 MMHG | OXYGEN SATURATION: 95 % | WEIGHT: 216.06 LBS | RESPIRATION RATE: 18 BRPM | HEIGHT: 65 IN

## 2020-06-30 PROBLEM — I47.29 NSVT (NONSUSTAINED VENTRICULAR TACHYCARDIA) (CMS/HCC): Status: ACTIVE | Noted: 2020-06-30

## 2020-06-30 LAB
ANION GAP SERPL CALC-SCNC: 6 MEQ/L (ref 3–15)
BUN SERPL-MCNC: 15 MG/DL (ref 8–20)
CALCIUM SERPL-MCNC: 8.9 MG/DL (ref 8.9–10.3)
CHLORIDE SERPL-SCNC: 100 MEQ/L (ref 98–109)
CO2 SERPL-SCNC: 28 MEQ/L (ref 22–32)
CREAT SERPL-MCNC: 0.8 MG/DL (ref 0.8–1.3)
GFR SERPL CREATININE-BSD FRML MDRD: >60 ML/MIN/1.73M*2
GLUCOSE SERPL-MCNC: 91 MG/DL (ref 70–99)
MAGNESIUM SERPL-MCNC: 1.8 MG/DL (ref 1.8–2.5)
MAGNESIUM SERPL-MCNC: 1.9 MG/DL (ref 1.8–2.5)
POTASSIUM SERPL-SCNC: 4.3 MEQ/L (ref 3.6–5.1)
SODIUM SERPL-SCNC: 134 MEQ/L (ref 136–144)

## 2020-06-30 PROCEDURE — 80048 BASIC METABOLIC PNL TOTAL CA: CPT | Performed by: NURSE PRACTITIONER

## 2020-06-30 PROCEDURE — 99232 SBSQ HOSP IP/OBS MODERATE 35: CPT | Performed by: HOSPITALIST

## 2020-06-30 PROCEDURE — 25800000 HC PHARMACY IV SOLUTIONS: Performed by: NURSE PRACTITIONER

## 2020-06-30 PROCEDURE — 83735 ASSAY OF MAGNESIUM: CPT | Performed by: NURSE PRACTITIONER

## 2020-06-30 PROCEDURE — 63700000 HC SELF-ADMINISTRABLE DRUG: Performed by: NURSE PRACTITIONER

## 2020-06-30 PROCEDURE — 36415 COLL VENOUS BLD VENIPUNCTURE: CPT | Performed by: NURSE PRACTITIONER

## 2020-06-30 PROCEDURE — 63600000 HC DRUGS/DETAIL CODE: Performed by: NURSE PRACTITIONER

## 2020-06-30 RX ADMIN — HEPARIN SODIUM 5000 UNITS: 5000 INJECTION, SOLUTION INTRAVENOUS; SUBCUTANEOUS at 05:18

## 2020-06-30 RX ADMIN — RAMIPRIL 10 MG: 5 CAPSULE ORAL at 09:47

## 2020-06-30 RX ADMIN — CYCLOBENZAPRINE HYDROCHLORIDE 10 MG: 10 TABLET, FILM COATED ORAL at 11:27

## 2020-06-30 RX ADMIN — HYDROCODONE BITARTRATE AND ACETAMINOPHEN 2 TABLET: 5; 325 TABLET ORAL at 09:47

## 2020-06-30 RX ADMIN — SENNOSIDES AND DOCUSATE SODIUM 1 TABLET: 8.6; 5 TABLET ORAL at 09:48

## 2020-06-30 RX ADMIN — HEPARIN SODIUM 5000 UNITS: 5000 INJECTION, SOLUTION INTRAVENOUS; SUBCUTANEOUS at 13:15

## 2020-06-30 RX ADMIN — HYDROCODONE BITARTRATE AND ACETAMINOPHEN 2 TABLET: 5; 325 TABLET ORAL at 14:10

## 2020-06-30 RX ADMIN — CARVEDILOL 25 MG: 25 TABLET, FILM COATED ORAL at 09:48

## 2020-06-30 RX ADMIN — POLYETHYLENE GLYCOL 3350 17 G: 17 POWDER, FOR SOLUTION ORAL at 09:47

## 2020-06-30 RX ADMIN — MAGNESIUM SULFATE HEPTAHYDRATE 1 G: 500 INJECTION, SOLUTION INTRAMUSCULAR; INTRAVENOUS at 11:23

## 2020-06-30 NOTE — DISCHARGE INSTR - OTHER ORDERS
It is important that you quit smoking.  Please call [phone] to join [facility] [employer]'s free tobacco cessation program.  New programs are starting in July.

## 2020-06-30 NOTE — PATIENT CARE CONFERENCE
Care Progression Rounds Note  Date: 6/30/2020  Time: 10:02 AM     Patient Name: Scottie Genao     Medical Record Number: [id number]   YOB: 1950  Sex: Male      Room/Bed: 5434    Admitting Diagnosis: Spinal stenosis, lumbar region, with neurogenic claudication [M48.062]  Spinal stenosis of lumbar region [M48.061]   Admit Date/Time: 6/26/2020  8:08 AM    Primary Diagnosis: Spinal stenosis of lumbar region  Principal Problem: Spinal stenosis of lumbar region    GMLOS: 2.2  Anticipated Discharge Date: 6/30/2020    AM-PAC  Mobility Score: 24    Discharge Planning:  Living Arrangements: house  Anticipated Discharge Disposition: home with assistance, home with home health services    Barriers to Discharge:  Barriers to Discharge: Medical issues not resolved    Participants:  advanced practice provider, , nursing, physical therapy, social work/services

## 2020-06-30 NOTE — ASSESSMENT & PLAN NOTE
8 beat NSVT on tele this am  Pt asymptomatic  Check K, Mg levels today  Does have extensive cardiac history and his BB dose held last evening for lower BP. Cont BB  Monitor on tele  outpt follow up with cardiology

## 2020-06-30 NOTE — PROGRESS NOTES
A tobacco cessation message and document have been included in the patient's After Visit Summary.

## 2020-06-30 NOTE — PROGRESS NOTES
Orthopaedic Spine Surgery Progress Note    [S]  DWIGHT. Pain well controlled. +Flatus.     [O]    Vitals:    06/30/20 0430   BP: 120/78   Pulse: 83   Resp: 18   Temp: 36.1 °C (97 °F)   SpO2: 93%         Physical Exam    Dressing in place, clean, dry, intact.   Drain: d/c'ed    RLE:  Inspection: No gross deformity. Skin in tact.   Neurovascular: Palpable DP pulse. SILT L1-S1 distibution.  Motor: IP 5/5, Quad 5/5, TA 5/5, EHL 5/5, GS 5/5    LLE:  Inspection: No gross deformity. Skin in tact.   Neurovascular: Palpable DP pulse. SILT L1-S1 distibution.  Motor: IP 5/5, Quad 5/5, TA 5/5, EHL 5/5, GS 5/5    AP: 69 y.o. male POD 4 s/p L1-S1 PLD    -- Pain control  -- WB: WBAT  -- DVT: SQH   -- Advance diet as tolerated  -- Drain d/c'ed  -- PT/OT  -- Monitor neurovascular status  - CAREN dressing   - Likely d/c home today    Vipin Henderson MD

## 2020-06-30 NOTE — NURSING NOTE
Received call from tele, pt had 8 beats of Vtac. Asymptomatic, VSS. Dr Vicente notified, no new orders received at this time.

## 2020-06-30 NOTE — PLAN OF CARE
Problem: Adult Inpatient Plan of Care  Goal: Plan of Care Review  Outcome: Progressing  Flowsheets (Taken 6/30/2020 0411)  Progress: improving  Plan of Care Reviewed With: patient  Outcome Summary: Pt oob with supervision. Pain controlled with 2 [place] as needed. Hemovac removed yesterday, for d/c home today

## 2020-06-30 NOTE — PROGRESS NOTES
Discharge instructions H/P faxed to [facility]. Confirmation received. Notified intake patient for discharge home today.

## 2020-06-30 NOTE — PROGRESS NOTES
Hospital Medicine Service -  Daily Progress Note       SUBJECTIVE   Interval History: Pt seen and examined. Denies any CP or SOB, no palpitations, no dizziness or lightheadedness.     OBJECTIVE      Vital signs in last 24 hours:  Temp:  [36.1 °C (97 °F)-36.8 °C (98.3 °F)] 36.2 °C (97.2 °F)  Heart Rate:  [69-83] 78  Resp:  [18] 18  BP: ()/(51-83) 141/83    Intake/Output Summary (Last 24 hours) at 6/30/2020 0937  Last data filed at 6/29/2020 1600  Gross per 24 hour   Intake 760 ml   Output 430 ml   Net 330 ml       PHYSICAL EXAMINATION      Physical Exam   Constitutional: He is oriented to person, place, and time. No distress.   HENT:   Head: Normocephalic and atraumatic.   Eyes: Conjunctivae are normal.   Neck: Neck supple.   Cardiovascular: Normal rate, regular rhythm and normal heart sounds.   Pulmonary/Chest: Effort normal and breath sounds normal.   Abdominal: Soft. Bowel sounds are normal. He exhibits no distension. There is no tenderness. There is no guarding.   Musculoskeletal: He exhibits no edema.   Neurological: He is alert and oriented to person, place, and time.   Skin: Skin is warm and dry.   Psychiatric: He has a normal mood and affect   LINES, CATHETERS, DRAINS, AIRWAYS, AND WOUNDS   Lines, Drains, Airways, Wounds:  Peripheral IV 06/28/20 Anterior;Proximal;Right Forearm (Active)   Number of days: 2       Surgical Incision Back (Active)   Number of days: 4       Comments:      LABS / IMAGING / TELE      Labs  BMP, Mg levels pending    Imaging  n/a    ECG/Telemetry  NSR, 8 beat NSVT early this am    ASSESSMENT AND PLAN      NSVT (nonsustained ventricular tachycardia) (CMS/[facility])  Assessment & Plan  8 beat NSVT on tele this am  Pt asymptomatic  Check K, Mg levels today  Does have extensive cardiac history and his BB dose held last evening for lower BP. Cont BB  Monitor on tele  outpt follow up with cardiology      Headache  Assessment & Plan  Resolved  likely from caffeine-withdrawal    Alcohol  use  Assessment & Plan  No withdrawal symptoms currently    Tobacco abuse  Assessment & Plan  Encouraged smoking cessation    Obstructive sleep apnea syndrome  Assessment & Plan  With known pulm HTN  Continue CPAP    Dyslipidemia  Assessment & Plan  Continue statin    Essential hypertension  Assessment & Plan  Continue coreg, ramipril    COPD (chronic obstructive pulmonary disease) (CMS/[facility])  Assessment & Plan  Stable, no acute symptoms  Continue Trelegy and CPAP  Continue albuterol prn    Coronary artery disease involving native coronary artery of native heart without angina pectoris  Assessment & Plan  Continue BB, statin, ACE  Resume ASA when ok with surgeon         VTE Assessment: Padua    VTE Prophylaxis Plan: heparin SQ  Code Status: Full Code  Estimated Discharge Date: 6/30/2020  Disposition Planning: per ortho team     Fran Kaplan MD  6/30/2020

## 2020-07-03 NOTE — DISCHARGE SUMMARY
Inpatient Discharge Summary    Pt is a 69 y.o. male with a history of L1-S1 senosis who presented with symptoms recalcitrant to non-surgical management. Dr. Momin recommended L1-S1 decompression. The patient underwent the procedure on 6/26/2020. Patient tolerated the procedure well. The patient's post-operative course was uncomplicated. The patient was seen by the orthopaedic and hospital medicine service and progressed to the point that on the day of discharge they were voiding without difficulty, tolerating a house diet, and were comfortable with all post-operative activity restrictions. At discharge the patient's vital signs were stable and the surgical incision site was clean, dry and intact. The patient was discharged on 6/30/2020 and told to continue pain medications and bowel regimen as needed, resume home medications except for those marked as held on discharge instructions, and to follow up with their surgeon. The patient was advised to call the office with any problems, including drainage from the incision, redness around the incision, worsening pain, fever greater than 101 degrees F, new numbness, weakness, or tingling in the extremities, numbness of the genital or saddle region, or incontinence of bowel or bladder.     Vipin Henderson MD

## 2022-05-13 NOTE — NURSING NOTE
Discharge instructions given to and reviewed with pt. Pt verbalized understanding.    Standing/Walking

## 2024-03-05 ENCOUNTER — APPOINTMENT (OUTPATIENT)
Age: 74
Setting detail: DERMATOLOGY
End: 2024-03-05

## 2024-03-05 PROBLEM — C44.321 SQUAMOUS CELL CARCINOMA OF SKIN OF NOSE: Status: ACTIVE | Noted: 2024-03-05

## 2024-03-05 PROCEDURE — 12051 INTMD RPR FACE/MM 2.5 CM/<: CPT

## 2024-03-05 PROCEDURE — OTHER MOHS SURGERY: OTHER

## 2024-03-05 PROCEDURE — OTHER MIPS QUALITY: OTHER

## 2024-03-05 PROCEDURE — 17311 MOHS 1 STAGE H/N/HF/G: CPT

## 2024-03-05 NOTE — PROCEDURE: MOHS SURGERY
Mohs Case Number: [id number]
Previous Accession (Optional): [id number]
Biopsy Photograph Reviewed: Yes
Referring Physician (Optional): Dr Jillian Mauro
Consent Type: Consent 1 (Standard)
Eye Shield Used: No
Initial Size Of Lesion: 0.5
X Size Of Lesion In Cm (Optional): 0.4
Number Of Stages: 1
Primary Defect Length In Cm (Final Defect Size - Required For Flaps/Grafts): 0.9
Primary Defect Width In Cm (Final Defect Size - Required For Flaps/Grafts): 0.8
Repair Type: Intermediate Layered Repair
Which Eyelid Repair Cpt Are You Using?: 11169
Oculoplastic Surgeon Procedure Text (A): After obtaining clear surgical margins the patient was sent to oculoplastics for surgical repair.  The patient understands they will receive post-surgical care and follow-up from the referring physician's office.
Otolaryngologist Procedure Text (A): After obtaining clear surgical margins the patient was sent to otolaryngology for surgical repair.  The patient understands they will receive post-surgical care and follow-up from the referring physician's office.
Plastic Surgeon Procedure Text (A): After obtaining clear surgical margins the patient was sent to plastics for surgical repair.  The patient understands they will receive post-surgical care and follow-up from the referring physician's office.
Mid-Level Procedure Text (A): After obtaining clear surgical margins the patient was sent to a mid-level provider for surgical repair.  The patient understands they will receive post-surgical care and follow-up from the mid-level provider.
Provider Procedure Text (A): After obtaining clear surgical margins the defect was repaired by another provider.
Asc Procedure Text (A): After obtaining clear surgical margins the patient was sent to an ASC for surgical repair.  The patient understands they will receive post-surgical care and follow-up from the ASC physician.
Simple / Intermediate / Complex Repair - Final Wound Length In Cm: 1.9
Suturegard Retention Suture: 2-0 Nylon
Retention Suture Bite Size: 3 mm
Length To Time In Minutes Device Was In Place: 10
Undermining Type: Entire Wound
Debridement Text: The wound edges were debrided prior to proceeding with the closure to facilitate wound healing.
Helical Rim Text: The closure involved the helical rim.
Vermilion Border Text: The closure involved the vermilion border.
Nostril Rim Text: The closure involved the nostril rim.
Retention Suture Text: Retention sutures were placed to support the closure and prevent dehiscence.
Secondary Defect Length In Cm (Required For Flaps): 0
Area H Indication Text: Tumors in this location are included in Area H (eyelids, eyebrows, nose, lips, chin, ear, pre-auricular, post-auricular, temple, genitalia, hands, feet, ankles and areola).  Tissue conservation is critical in these anatomic locations.
Area M Indication Text: Tumors in this location are included in Area M (cheek, forehead, scalp, neck, jawline and pretibial skin).  Mohs surgery is indicated for tumors in these anatomic locations.
Area L Indication Text: Tumors in this location are included in Area L (trunk and extremities).  Mohs surgery is indicated for larger tumors, or tumors with aggressive histologic features, in these anatomic locations.
Depth Of Tumor Invasion (For Histology): tumor not visualized (deep and peripheral margins are clear of tumor)
Perineural Invasion (For Histology - Be Specific If Possible): absent
Stage 1: Number Of Blocks?: 2
Special Stains Stage 1 - Results: Base On Clearance Noted Above
Stage 2: Additional Anesthesia Type: 1% lidocaine with epinephrine
Staging Info: By selecting yes to the question above you will include information on AJCC 8 tumor staging in your Mohs note. Information on tumor staging will be automatically added for SCCs on the head and neck. AJCC 8 includes tumor size, tumor depth, perineural involvement and bone invasion.
Tumor Depth: Less than 6mm from granular layer and no invasion beyond the subcutaneous fat
Was The Patient On Physician Recommended Anticoagulation Therapy?: Please Select the Appropriate Response
Medical Necessity Statement: Based on my medical judgement, Mohs surgery is the most appropriate treatment for this cancer compared to other treatments.
Alternatives Discussed Intro (Do Not Add Period): I discussed alternative treatments to Mohs surgery and specifically discussed the risks and benefits of
Consent 1/Introductory Paragraph: The rationale for Mohs was explained to the patient and consent was obtained. The risks, benefits and alternatives to therapy were discussed in detail. Specifically, the risks of infection, scarring, bleeding, prolonged wound healing, incomplete removal, allergy to anesthesia, nerve injury and recurrence were addressed. Prior to the procedure, the treatment site was clearly identified and confirmed by the patient. All components of Universal Protocol/PAUSE Rule completed.
Consent 2/Introductory Paragraph: Mohs surgery was explained to the patient and consent was obtained. The risks, benefits and alternatives to therapy were discussed in detail. Specifically, the risks of infection, scarring, bleeding, prolonged wound healing, incomplete removal, allergy to anesthesia, nerve injury and recurrence were addressed. Prior to the procedure, the treatment site was clearly identified and confirmed by the patient. All components of Universal Protocol/PAUSE Rule completed.
Consent 3/Introductory Paragraph: I gave the patient a chance to ask questions they had about the procedure.  Following this I explained the Mohs procedure and consent was obtained. The risks, benefits and alternatives to therapy were discussed in detail. Specifically, the risks of infection, scarring, bleeding, prolonged wound healing, incomplete removal, allergy to anesthesia, nerve injury and recurrence were addressed. Prior to the procedure, the treatment site was clearly identified and confirmed by the patient. All components of Universal Protocol/PAUSE Rule completed.
Consent (Temporal Branch)/Introductory Paragraph: The rationale for Mohs was explained to the patient and consent was obtained. The risks, benefits and alternatives to therapy were discussed in detail. Specifically, the risks of damage to the temporal branch of the facial nerve, infection, scarring, bleeding, prolonged wound healing, incomplete removal, allergy to anesthesia, and recurrence were addressed. Prior to the procedure, the treatment site was clearly identified and confirmed by the patient. All components of Universal Protocol/PAUSE Rule completed.
Consent (Marginal Mandibular)/Introductory Paragraph: The rationale for Mohs was explained to the patient and consent was obtained. The risks, benefits and alternatives to therapy were discussed in detail. Specifically, the risks of damage to the marginal mandibular branch of the facial nerve, infection, scarring, bleeding, prolonged wound healing, incomplete removal, allergy to anesthesia, and recurrence were addressed. Prior to the procedure, the treatment site was clearly identified and confirmed by the patient. All components of Universal Protocol/PAUSE Rule completed.
Consent (Spinal Accessory)/Introductory Paragraph: The rationale for Mohs was explained to the patient and consent was obtained. The risks, benefits and alternatives to therapy were discussed in detail. Specifically, the risks of damage to the spinal accessory nerve, infection, scarring, bleeding, prolonged wound healing, incomplete removal, allergy to anesthesia, and recurrence were addressed. Prior to the procedure, the treatment site was clearly identified and confirmed by the patient. All components of Universal Protocol/PAUSE Rule completed.
Consent (Near Eyelid Margin)/Introductory Paragraph: The rationale for Mohs was explained to the patient and consent was obtained. The risks, benefits and alternatives to therapy were discussed in detail. Specifically, the risks of ectropion or eyelid deformity, infection, scarring, bleeding, prolonged wound healing, incomplete removal, allergy to anesthesia, nerve injury and recurrence were addressed. Prior to the procedure, the treatment site was clearly identified and confirmed by the patient. All components of Universal Protocol/PAUSE Rule completed.
Consent (Ear)/Introductory Paragraph: The rationale for Mohs was explained to the patient and consent was obtained. The risks, benefits and alternatives to therapy were discussed in detail. Specifically, the risks of ear deformity, infection, scarring, bleeding, prolonged wound healing, incomplete removal, allergy to anesthesia, nerve injury and recurrence were addressed. Prior to the procedure, the treatment site was clearly identified and confirmed by the patient. All components of Universal Protocol/PAUSE Rule completed.
Consent (Nose)/Introductory Paragraph: The rationale for Mohs was explained to the patient and consent was obtained. The risks, benefits and alternatives to therapy were discussed in detail. Specifically, the risks of nasal deformity, changes in the flow of air through the nose, infection, scarring, bleeding, prolonged wound healing, incomplete removal, allergy to anesthesia, nerve injury and recurrence were addressed. Prior to the procedure, the treatment site was clearly identified and confirmed by the patient. All components of Universal Protocol/PAUSE Rule completed.
Consent (Lip)/Introductory Paragraph: The rationale for Mohs was explained to the patient and consent was obtained. The risks, benefits and alternatives to therapy were discussed in detail. Specifically, the risks of lip deformity, changes in the oral aperture, infection, scarring, bleeding, prolonged wound healing, incomplete removal, allergy to anesthesia, nerve injury and recurrence were addressed. Prior to the procedure, the treatment site was clearly identified and confirmed by the patient. All components of Universal Protocol/PAUSE Rule completed.
Consent (Scalp)/Introductory Paragraph: The rationale for Mohs was explained to the patient and consent was obtained. The risks, benefits and alternatives to therapy were discussed in detail. Specifically, the risks of changes in hair growth pattern secondary to repair, infection, scarring, bleeding, prolonged wound healing, incomplete removal, allergy to anesthesia, nerve injury and recurrence were addressed. Prior to the procedure, the treatment site was clearly identified and confirmed by the patient. All components of Universal Protocol/PAUSE Rule completed.
Detail Level: Detailed
Postop Diagnosis: same
Anesthesia Volume In Cc: 1.5
Hemostasis: Electrocautery
Estimated Blood Loss (Cc): minimal
Brow Lift Text: A midfrontal incision was made medially to the defect to allow access to the tissues just superior to the left eyebrow. Following careful dissection inferiorly in a supraperiosteal plane to the level of the left eyebrow, several 3-0 monocryl sutures were used to resuspend the eyebrow orbicularis oculi muscular unit to the superior frontal bone periosteum. This resulted in an appropriate reapproximation of static eyebrow symmetry and correction of the left brow ptosis.
Deep Sutures: 4-0 PGLC
Epidermal Sutures: 5-0 Fast Absorbing Gut
Epidermal Closure: simple interrupted
Suturegard Intro: Intraoperative tissue expansion was performed, utilizing the SUTUREGARD device, in order to reduce wound tension.
Suturegard Body: The suture ends were repeatedly re-tightened and re-clamped to achieve the desired tissue expansion.
Hemigard Intro: Due to skin fragility and wound tension, it was decided to use HEMIGARD adhesive retention suture devices to permit a linear closure. The skin was cleaned and dried for a 6cm distance away from the wound. Excessive hair, if present, was removed to allow for adhesion.
Hemigard Postcare Instructions: The HEMIGARD strips are to remain completely dry for at least 5-7 days.
Donor Site Anesthesia Type: same as repair anesthesia
Graft Donor Site Bandage (Optional-Leave Blank If You Don't Want In Note): Steri-strips and a pressure bandage were applied to the donor site.
Closure 2 Information: This tab is for additional flaps and grafts, including complex repair and grafts and complex repair and flaps. You can also specify a different location for the additional defect, if the location is the same you do not need to select a new one. We will insert the automated text for the repair you select below just as we do for solitary flaps and grafts. Please note that at this time if you select a location with a different insurance zone you will need to override the ICD10 and CPT if appropriate.
Closure 3 Information: This tab is for additional flaps and grafts above and beyond our usual structured repairs.  Please note if you enter information here it will not currently bill and you will need to add the billing information manually.
Wound Care: Petrolatum
Dressing: dry sterile dressing
Unna Boot Text: An Unna boot was placed to help immobilize the limb and facilitate more rapid healing.
Home Suture Removal Text: Patient was provided instructions on removing sutures and will remove their sutures at home.  If they have any questions or difficulties they will call the office.
Post-Care Instructions: I reviewed with the patient in detail post-care instructions. Patient is not to engage in any heavy lifting, exercise, or swimming for the next 14 days. Should the patient develop any fevers, chills, bleeding, severe pain patient will contact the office immediately.
Pain Refusal Text: I offered to prescribe pain medication but the patient refused to take this medication.
Mauc Instructions: By selecting yes to the question below the MAUC number will be added into the note.  This will be calculated automatically based on the diagnosis chosen, the size entered, the body zone selected (H,M,L) and the specific indications you chose. You will also have the option to override the Mohs AUC if you disagree with the automatically calculated number and this option is found in the Case Summary tab.
Where Do You Want The Question To Include Opioid Counseling Located?: Case Summary Tab
Eye Protection Verbiage: Before proceeding with the stage, a plastic scleral shield was inserted. The globe was anesthetized with a few drops of 1% lidocaine with 1:100,000 epinephrine. Then, an appropriate sized scleral shield was chosen and coated with lacrilube ointment. The shield was gently inserted and left in place for the duration of each stage. After the stage was completed, the shield was gently removed.
Mohs Method Verbiage: An incision at a 45 degree angle following the standard Mohs approach was done and the specimen was harvested as a microscopic controlled layer.
Surgeon/Pathologist Verbiage (Will Incorporate Name Of Surgeon From Intro If Not Blank): operated in two distinct and integrated capacities as the surgeon and pathologist.
Mohs Histo Method Verbiage: Each section was then chromacoded and processed in the Mohs lab using the Mohs protocol and submitted for frozen section, utilizing hematoxylin and eosin histochemical stains.
Subsequent Stages Histo Method Verbiage: Using a similar technique to that described above, a thin layer of tissue was removed from all areas where tumor was visible on the previous stage.  The tissue was again oriented, mapped, dyed, and processed as above.
Mohs Rapid Report Verbiage: The area of clinically evident tumor was marked with skin marking ink and appropriately hatched.  The initial incision was made following the Mohs approach through the skin.  The specimen was taken to the lab, divided into the necessary number of pieces, chromacoded and processed according to the Mohs protocol.  This was repeated in successive stages until a tumor free defect was achieved.
Complex Repair Preamble Text (Leave Blank If You Do Not Want): Extensive wide undermining was performed.
Intermediate Repair Preamble Text (Leave Blank If You Do Not Want): Undermining was performed with blunt dissection.
Non-Graft Cartilage Fenestration Text: The cartilage was fenestrated with a 2mm punch biopsy to help facilitate healing.
Graft Cartilage Fenestration Text: The cartilage was fenestrated with a 2mm punch biopsy to help facilitate graft survival and healing.
Wound Check: 14 days
Secondary Intention Text (Leave Blank If You Do Not Want): The defect will heal with secondary intention.
No Repair - Repaired With Adjacent Surgical Defect Text (Leave Blank If You Do Not Want): After obtaining clear surgical margins the defect was repaired concurrently with another surgical defect which was in close approximation.
Adjacent Tissue Transfer Text: The defect edges were debeveled with a #15 scalpel blade. Given the location of the defect and the proximity to free margins an adjacent tissue transfer was deemed most appropriate. Using a sterile surgical marker, an appropriate flap was drawn incorporating the defect and placing the expected incisions within the relaxed skin tension lines where possible. The area thus outlined was incised deep to adipose tissue with a #15 scalpel blade. The skin margins were undermined to an appropriate distance in all directions utilizing iris scissors and carried over to close the primary defect.
Advancement Flap (Single) Text: The defect edges were debeveled with a #15 scalpel blade. Given the location of the defect and the proximity to free margins a single advancement flap was deemed most appropriate. Using a sterile surgical marker, an appropriate advancement flap was drawn incorporating the defect and placing the expected incisions within the relaxed skin tension lines where possible. The area thus outlined was incised deep to adipose tissue with a #15 scalpel blade. The skin margins were undermined to an appropriate distance in all directions utilizing iris scissors. Following this, the designed flap was advanced and carried over into the primary defect and sutured into place.
Advancement Flap (Double) Text: The defect edges were debeveled with a #15 scalpel blade. Given the location of the defect and the proximity to free margins a double advancement flap was deemed most appropriate. Using a sterile surgical marker, the appropriate advancement flaps were drawn incorporating the defect and placing the expected incisions within the relaxed skin tension lines where possible. The area thus outlined was incised deep to adipose tissue with a #15 scalpel blade. The skin margins were undermined to an appropriate distance in all directions utilizing iris scissors. Following this, the designed flaps were advanced and carried over into the primary defect and sutured into place.
Burow's Advancement Flap Text: The defect edges were debeveled with a #15 scalpel blade. Given the location of the defect and the proximity to free margins a Burow's advancement flap was deemed most appropriate. Using a sterile surgical marker, the appropriate advancement flap was drawn incorporating the defect and placing the expected incisions within the relaxed skin tension lines where possible. The area thus outlined was incised deep to adipose tissue with a #15 scalpel blade. The skin margins were undermined to an appropriate distance in all directions utilizing iris scissors. Following this, the designed flap was advanced and carried over into the primary defect and sutured into place.
Chonodrocutaneous Helical Advancement Flap Text: The defect edges were debeveled with a #15 scalpel blade. Given the location of the defect and the proximity to free margins a chondrocutaneous helical advancement flap was deemed most appropriate. Using a sterile surgical marker, the appropriate advancement flap was drawn incorporating the defect and placing the expected incisions within the relaxed skin tension lines where possible. The area thus outlined was incised deep to adipose tissue with a #15 scalpel blade. The skin margins were undermined to an appropriate distance in all directions utilizing iris scissors. Following this, the designed flap was advanced and carried over into the primary defect and sutured into place.
Crescentic Advancement Flap Text: The defect edges were debeveled with a #15 scalpel blade. Given the location of the defect and the proximity to free margins a crescentic advancement flap was deemed most appropriate. Using a sterile surgical marker, the appropriate advancement flap was drawn incorporating the defect and placing the expected incisions within the relaxed skin tension lines where possible. The area thus outlined was incised deep to adipose tissue with a #15 scalpel blade. The skin margins were undermined to an appropriate distance in all directions utilizing iris scissors. Following this, the designed flap was advanced and carried over into the primary defect and sutured into place.
A-T Advancement Flap Text: The defect edges were debeveled with a #15 scalpel blade. Given the location of the defect, shape of the defect and the proximity to free margins an A-T advancement flap was deemed most appropriate. Using a sterile surgical marker, an appropriate advancement flap was drawn incorporating the defect and placing the expected incisions within the relaxed skin tension lines where possible. The area thus outlined was incised deep to adipose tissue with a #15 scalpel blade. The skin margins were undermined to an appropriate distance in all directions utilizing iris scissors. Following this, the designed flap was advanced and carried over into the primary defect and sutured into place.
O-T Advancement Flap Text: The defect edges were debeveled with a #15 scalpel blade. Given the location of the defect, shape of the defect and the proximity to free margins an O-T advancement flap was deemed most appropriate. Using a sterile surgical marker, an appropriate advancement flap was drawn incorporating the defect and placing the expected incisions within the relaxed skin tension lines where possible. The area thus outlined was incised deep to adipose tissue with a #15 scalpel blade. The skin margins were undermined to an appropriate distance in all directions utilizing iris scissors. Following this, the designed flap was advanced and carried over into the primary defect and sutured into place.
O-L Flap Text: The defect edges were debeveled with a #15 scalpel blade. Given the location of the defect, shape of the defect and the proximity to free margins an O-L flap was deemed most appropriate. Using a sterile surgical marker, an appropriate advancement flap was drawn incorporating the defect and placing the expected incisions within the relaxed skin tension lines where possible. The area thus outlined was incised deep to adipose tissue with a #15 scalpel blade. The skin margins were undermined to an appropriate distance in all directions utilizing iris scissors. Following this, the designed flap was advanced and carried over into the primary defect and sutured into place.
O-Z Flap Text: The defect edges were debeveled with a #15 scalpel blade. Given the location of the defect, shape of the defect and the proximity to free margins an O-Z flap was deemed most appropriate. Using a sterile surgical marker, an appropriate transposition flap was drawn incorporating the defect and placing the expected incisions within the relaxed skin tension lines where possible. The area thus outlined was incised deep to adipose tissue with a #15 scalpel blade. The skin margins were undermined to an appropriate distance in all directions utilizing iris scissors. Following this, the designed flap was carried over into the primary defect and sutured into place.
Double O-Z Flap Text: The defect edges were debeveled with a #15 scalpel blade. Given the location of the defect, shape of the defect and the proximity to free margins a Double O-Z flap was deemed most appropriate. Using a sterile surgical marker, an appropriate transposition flap was drawn incorporating the defect and placing the expected incisions within the relaxed skin tension lines where possible. The area thus outlined was incised deep to adipose tissue with a #15 scalpel blade. The skin margins were undermined to an appropriate distance in all directions utilizing iris scissors. Following this, the designed flap was carried over into the primary defect and sutured into place.
V-Y Flap Text: The defect edges were debeveled with a #15 scalpel blade. Given the location of the defect, shape of the defect and the proximity to free margins a V-Y flap was deemed most appropriate. Using a sterile surgical marker, an appropriate advancement flap was drawn incorporating the defect and placing the expected incisions within the relaxed skin tension lines where possible. The area thus outlined was incised deep to adipose tissue with a #15 scalpel blade. The skin margins were undermined to an appropriate distance in all directions utilizing iris scissors. Following this, the designed flap was advanced and carried over into the primary defect and sutured into place.
Advancement-Rotation Flap Text: The defect edges were debeveled with a #15 scalpel blade. Given the location of the defect, shape of the defect and the proximity to free margins an advancement-rotation flap was deemed most appropriate. Using a sterile surgical marker, an appropriate flap was drawn incorporating the defect and placing the expected incisions within the relaxed skin tension lines where possible. The area thus outlined was incised deep to adipose tissue with a #15 scalpel blade. The skin margins were undermined to an appropriate distance in all directions utilizing iris scissors. Following this, the designed flap was carried over into the primary defect and sutured into place.
Mercedes Flap Text: The defect edges were debeveled with a #15 scalpel blade. Given the location of the defect, shape of the defect and the proximity to free margins a Mercedes flap was deemed most appropriate. Using a sterile surgical marker, an appropriate advancement flap was drawn incorporating the defect and placing the expected incisions within the relaxed skin tension lines where possible. The area thus outlined was incised deep to adipose tissue with a #15 scalpel blade. The skin margins were undermined to an appropriate distance in all directions utilizing iris scissors. Following this, the designed flap was advanced and carried over into the primary defect and sutured into place.
Modified Advancement Flap Text: The defect edges were debeveled with a #15 scalpel blade. Given the location of the defect, shape of the defect and the proximity to free margins a modified advancement flap was deemed most appropriate. Using a sterile surgical marker, an appropriate advancement flap was drawn incorporating the defect and placing the expected incisions within the relaxed skin tension lines where possible. The area thus outlined was incised deep to adipose tissue with a #15 scalpel blade. The skin margins were undermined to an appropriate distance in all directions utilizing iris scissors. Following this, the designed flap was advanced and carried over into the primary defect and sutured into place.
Mucosal Advancement Flap Text: Given the location of the defect, shape of the defect and the proximity to free margins a mucosal advancement flap was deemed most appropriate. Incisions were made with a 15 blade scalpel in the appropriate fashion along the cutaneous vermilion border and the mucosal lip. The remaining actinically damaged mucosal tissue was excised.  The mucosal advancement flap was then elevated to the gingival sulcus with care taken to preserve the neurovascular structures and advanced into the primary defect. Care was taken to ensure that precise realignment of the vermilion border was achieved.
Peng Advancement Flap Text: The defect edges were debeveled with a #15 scalpel blade. Given the location of the defect, shape of the defect and the proximity to free margins a Peng advancement flap was deemed most appropriate. Using a sterile surgical marker, an appropriate advancement flap was drawn incorporating the defect and placing the expected incisions within the relaxed skin tension lines where possible. The area thus outlined was incised deep to adipose tissue with a #15 scalpel blade. The skin margins were undermined to an appropriate distance in all directions utilizing iris scissors. Following this, the designed flap was advanced and carried over into the primary defect and sutured into place.
Hatchet Flap Text: The defect edges were debeveled with a #15 scalpel blade. Given the location of the defect, shape of the defect and the proximity to free margins a hatchet flap was deemed most appropriate. Using a sterile surgical marker, an appropriate hatchet flap was drawn incorporating the defect and placing the expected incisions within the relaxed skin tension lines where possible. The area thus outlined was incised deep to adipose tissue with a #15 scalpel blade. The skin margins were undermined to an appropriate distance in all directions utilizing iris scissors. Following this, the designed flap was carried over into the primary defect and sutured into place.
Rotation Flap Text: The defect edges were debeveled with a #15 scalpel blade. Given the location of the defect, shape of the defect and the proximity to free margins a rotation flap was deemed most appropriate. Using a sterile surgical marker, an appropriate rotation flap was drawn incorporating the defect and placing the expected incisions within the relaxed skin tension lines where possible. The area thus outlined was incised deep to adipose tissue with a #15 scalpel blade. The skin margins were undermined to an appropriate distance in all directions utilizing iris scissors. Following this, the designed flap was carried over into the primary defect and sutured into place.
Bilateral Rotation Flap Text: The defect edges were debeveled with a #15 scalpel blade. Given the location of the defect, shape of the defect and the proximity to free margins a bilateral rotation flap was deemed most appropriate. Using a sterile surgical marker, an appropriate rotation flap was drawn incorporating the defect and placing the expected incisions within the relaxed skin tension lines where possible. The area thus outlined was incised deep to adipose tissue with a #15 scalpel blade. The skin margins were undermined to an appropriate distance in all directions utilizing iris scissors. Following this, the designed flap was carried over into the primary defect and sutured into place.
Spiral Flap Text: The defect edges were debeveled with a #15 scalpel blade. Given the location of the defect, shape of the defect and the proximity to free margins a spiral flap was deemed most appropriate. Using a sterile surgical marker, an appropriate rotation flap was drawn incorporating the defect and placing the expected incisions within the relaxed skin tension lines where possible. The area thus outlined was incised deep to adipose tissue with a #15 scalpel blade. The skin margins were undermined to an appropriate distance in all directions utilizing iris scissors. Following this, the designed flap was carried over into the primary defect and sutured into place.
Staged Advancement Flap Text: The defect edges were debeveled with a #15 scalpel blade. Given the location of the defect, shape of the defect and the proximity to free margins a staged advancement flap was deemed most appropriate. Using a sterile surgical marker, an appropriate advancement flap was drawn incorporating the defect and placing the expected incisions within the relaxed skin tension lines where possible. The area thus outlined was incised deep to adipose tissue with a #15 scalpel blade. The skin margins were undermined to an appropriate distance in all directions utilizing iris scissors. Following this, the designed flap was carried over into the primary defect and sutured into place.
Star Wedge Flap Text: The defect edges were debeveled with a #15 scalpel blade. Given the location of the defect, shape of the defect and the proximity to free margins a star wedge flap was deemed most appropriate. Using a sterile surgical marker, an appropriate rotation flap was drawn incorporating the defect and placing the expected incisions within the relaxed skin tension lines where possible. The area thus outlined was incised deep to adipose tissue with a #15 scalpel blade. The skin margins were undermined to an appropriate distance in all directions utilizing iris scissors. Following this, the designed flap was carried over into the primary defect and sutured into place.
Transposition Flap Text: The defect edges were debeveled with a #15 scalpel blade. Given the location of the defect and the proximity to free margins a transposition flap was deemed most appropriate. Using a sterile surgical marker, an appropriate transposition flap was drawn incorporating the defect. The area thus outlined was incised deep to adipose tissue with a #15 scalpel blade. The skin margins were undermined to an appropriate distance in all directions utilizing iris scissors. Following this, the designed flap was carried over into the primary defect and sutured into place.
Muscle Hinge Flap Text: The defect edges were debeveled with a #15 scalpel blade.  Given the size, depth and location of the defect and the proximity to free margins a muscle hinge flap was deemed most appropriate. Using a sterile surgical marker, an appropriate hinge flap was drawn incorporating the defect. The area thus outlined was incised with a #15 scalpel blade. The skin margins were undermined to an appropriate distance in all directions utilizing iris scissors. Following this, the designed flap was carried into the primary defect and sutured into place.
Mustarde Flap Text: The defect edges were debeveled with a #15 scalpel blade.  Given the size, depth and location of the defect and the proximity to free margins a Mustarde flap was deemed most appropriate. Using a sterile surgical marker, an appropriate flap was drawn incorporating the defect. The area thus outlined was incised with a #15 scalpel blade. The skin margins were undermined to an appropriate distance in all directions utilizing iris scissors. Following this, the designed flap was carried into the primary defect and sutured into place.
Nasal Turnover Hinge Flap Text: The defect edges were debeveled with a #15 scalpel blade.  Given the size, depth, location of the defect and the defect being full thickness a nasal turnover hinge flap was deemed most appropriate. Using a sterile surgical marker, an appropriate hinge flap was drawn incorporating the defect. The area thus outlined was incised with a #15 scalpel blade. The flap was designed to recreate the nasal mucosal lining and the alar rim. The skin margins were undermined to an appropriate distance in all directions utilizing iris scissors. Following this, the designed flap was carried over into the primary defect and sutured into place
Nasalis-Muscle-Based Myocutaneous Island Pedicle Flap Text: Using a #15 blade, an incision was made around the donor flap to the level of the nasalis muscle. Wide lateral undermining was then performed in both the subcutaneous plane above the nasalis muscle, and in a submuscular plane just above periosteum. This allowed the formation of a free nasalis muscle axial pedicle (based on the angular artery) which was still attached to the actual cutaneous flap, increasing its mobility and vascular viability. Hemostasis was obtained with pinpoint electrocoagulation. The flap was mobilized into position and the pivotal anchor points positioned and stabilized with buried interrupted sutures. Subcutaneous and dermal tissues were closed in a multilayered fashion with sutures. Tissue redundancies were excised, and the epidermal edges were apposed without significant tension and sutured with sutures.
Nasalis Myocutaneous Flap Text: Using a #15 blade, an incision was made around the donor flap to the level of the nasalis muscle. Wide lateral undermining was then performed in both the subcutaneous plane above the nasalis muscle, and in a submuscular plane just above periosteum. This allowed the formation of a free nasalis muscle axial pedicle which was still attached to the actual cutaneous flap, increasing its mobility and vascular viability. Hemostasis was obtained with pinpoint electrocoagulation. The flap was mobilized into position and the pivotal anchor points positioned and stabilized with buried interrupted sutures. Subcutaneous and dermal tissues were closed in a multilayered fashion with sutures. Tissue redundancies were excised, and the epidermal edges were apposed without significant tension and sutured with sutures.
Orbicularis Oris Muscle Flap Text: The defect edges were debeveled with a #15 scalpel blade.  Given that the defect affected the competency of the oral sphincter an orbicularis oris muscle flap was deemed most appropriate to restore this competency and normal muscle function.  Using a sterile surgical marker, an appropriate flap was drawn incorporating the defect. The area thus outlined was incised with a #15 scalpel blade. Following this, the designed flap was carried over into the primary defect and sutured into place.
Melolabial Transposition Flap Text: The defect edges were debeveled with a #15 scalpel blade. Given the location of the defect and the proximity to free margins a melolabial flap was deemed most appropriate. Using a sterile surgical marker, an appropriate melolabial transposition flap was drawn incorporating the defect. The area thus outlined was incised deep to adipose tissue with a #15 scalpel blade. The skin margins were undermined to an appropriate distance in all directions utilizing iris scissors. Following this, the designed flap was carried over into the primary defect and sutured into place.
Rectangular Flap Text: The defect edges were debeveled with a #15 scalpel blade. Given the location of the defect and the proximity to free margins a rectangular flap was deemed most appropriate. Using a sterile surgical marker, an appropriate rectangular flap was drawn incorporating the defect. The area thus outlined was incised deep to adipose tissue with a #15 scalpel blade. The skin margins were undermined to an appropriate distance in all directions utilizing iris scissors. Following this, the designed flap was carried over into the primary defect and sutured into place.
Rhombic Flap Text: The defect edges were debeveled with a #15 scalpel blade. Given the location of the defect and the proximity to free margins a rhombic flap was deemed most appropriate. Using a sterile surgical marker, an appropriate rhombic flap was drawn incorporating the defect. The area thus outlined was incised deep to adipose tissue with a #15 scalpel blade. The skin margins were undermined to an appropriate distance in all directions utilizing iris scissors. Following this, the designed flap was carried over into the primary defect and sutured into place.
Rhomboid Transposition Flap Text: The defect edges were debeveled with a #15 scalpel blade. Given the location of the defect and the proximity to free margins a rhomboid transposition flap was deemed most appropriate. Using a sterile surgical marker, an appropriate rhomboid flap was drawn incorporating the defect. The area thus outlined was incised deep to adipose tissue with a #15 scalpel blade. The skin margins were undermined to an appropriate distance in all directions utilizing iris scissors. Following this, the designed flap was carried over into the primary defect and sutured into place.
Bi-Rhombic Flap Text: The defect edges were debeveled with a #15 scalpel blade. Given the location of the defect and the proximity to free margins a bi-rhombic flap was deemed most appropriate. Using a sterile surgical marker, an appropriate rhombic flap was drawn incorporating the defect. The area thus outlined was incised deep to adipose tissue with a #15 scalpel blade. The skin margins were undermined to an appropriate distance in all directions utilizing iris scissors. Following this, the designed flap was carried over into the primary defect and sutured into place.
Helical Rim Advancement Flap Text: The defect edges were debeveled with a #15 blade scalpel.  Given the location of the defect and the proximity to free margins (helical rim) a double helical rim advancement flap was deemed most appropriate. Using a sterile surgical marker, the appropriate advancement flaps were drawn incorporating the defect and placing the expected incisions between the helical rim and antihelix where possible.  The area thus outlined was incised through and through with a #15 scalpel blade.  With a skin hook and iris scissors, the flaps were gently and sharply undermined and freed up. Folllowing this, the designed flaps were carried over into the primary defect and sutured into place.
Bilateral Helical Rim Advancement Flap Text: The defect edges were debeveled with a #15 blade scalpel.  Given the location of the defect and the proximity to free margins (helical rim) a bilateral helical rim advancement flap was deemed most appropriate. Using a sterile surgical marker, the appropriate advancement flaps were drawn incorporating the defect and placing the expected incisions between the helical rim and antihelix where possible.  The area thus outlined was incised through and through with a #15 scalpel blade.  With a skin hook and iris scissors, the flaps were gently and sharply undermined and freed up. Following this, the designed flaps were placed into the primary defect and sutured into place.
Ear Star Wedge Flap Text: The defect edges were debeveled with a #15 blade scalpel.  Given the location of the defect and the proximity to free margins (helical rim) an ear star wedge flap was deemed most appropriate. Using a sterile surgical marker, the appropriate flap was drawn incorporating the defect and placing the expected incisions between the helical rim and antihelix where possible.  The area thus outlined was incised through and through with a #15 scalpel blade. Following this, the designed flap was carried over into the primary defect and sutured into place.
Banner Transposition Flap Text: The defect edges were debeveled with a #15 scalpel blade. Given the location of the defect and the proximity to free margins a Banner transposition flap was deemed most appropriate. Using a sterile surgical marker, an appropriate flap was drawn around the defect. The area thus outlined was incised deep to adipose tissue with a #15 scalpel blade. The skin margins were undermined to an appropriate distance in all directions utilizing iris scissors. Following this, the designed flap was carried into the primary defect and sutured into place.
Bilobed Flap Text: The defect edges were debeveled with a #15 scalpel blade. Given the location of the defect and the proximity to free margins a bilobe flap was deemed most appropriate. Using a sterile surgical marker, an appropriate bilobe flap drawn around the defect. The area thus outlined was incised deep to adipose tissue with a #15 scalpel blade. The skin margins were undermined to an appropriate distance in all directions utilizing iris scissors. Following this, the designed flap was carried over into the primary defect and sutured into place.
Bilobed Transposition Flap Text: The defect edges were debeveled with a #15 scalpel blade. Given the location of the defect and the proximity to free margins a bilobed transposition flap was deemed most appropriate. Using a sterile surgical marker, an appropriate bilobe flap drawn around the defect. The area thus outlined was incised deep to adipose tissue with a #15 scalpel blade. The skin margins were undermined to an appropriate distance in all directions utilizing iris scissors. Following this, the designed flap was carried over into the primary defect and sutured into place.
Trilobed Flap Text: The defect edges were debeveled with a #15 scalpel blade. Given the location of the defect and the proximity to free margins a trilobed flap was deemed most appropriate. Using a sterile surgical marker, an appropriate trilobed flap was drawn around the defect. The area thus outlined was incised deep to adipose tissue with a #15 scalpel blade. The skin margins were undermined to an appropriate distance in all directions utilizing iris scissors. Following this, the designed flap was carried into the primary defect and sutured into place.
Dorsal Nasal Flap Text: The defect edges were debeveled with a #15 scalpel blade. Given the location of the defect and the proximity to free margins a dorsal nasal flap was deemed most appropriate. Using a sterile surgical marker, an appropriate dorsal nasal flap was drawn around the defect. The area thus outlined was incised deep to adipose tissue with a #15 scalpel blade. The skin margins were undermined to an appropriate distance in all directions utilizing iris scissors. Following this, the designed flap was carried into the primary defect and sutured into place.
Island Pedicle Flap Text: The defect edges were debeveled with a #15 scalpel blade. Given the location of the defect, shape of the defect and the proximity to free margins an island pedicle advancement flap was deemed most appropriate. Using a sterile surgical marker, an appropriate advancement flap was drawn incorporating the defect, outlining the appropriate donor tissue and placing the expected incisions within the relaxed skin tension lines where possible. The area thus outlined was incised deep to adipose tissue with a #15 scalpel blade. The skin margins were undermined to an appropriate distance in all directions around the primary defect and laterally outward around the island pedicle utilizing iris scissors.  There was minimal undermining beneath the pedicle flap. Following this, the flap was carried over into the primary defect and sutured into place.
Island Pedicle Flap With Canthal Suspension Text: The defect edges were debeveled with a #15 scalpel blade. Given the location of the defect, shape of the defect and the proximity to free margins an island pedicle advancement flap was deemed most appropriate. Using a sterile surgical marker, an appropriate advancement flap was drawn incorporating the defect, outlining the appropriate donor tissue and placing the expected incisions within the relaxed skin tension lines where possible. The area thus outlined was incised deep to adipose tissue with a #15 scalpel blade. The skin margins were undermined to an appropriate distance in all directions around the primary defect and laterally outward around the island pedicle utilizing iris scissors.  There was minimal undermining beneath the pedicle flap. A suspension suture was placed in the canthal tendon to prevent tension and prevent ectropion. Following this, the designed flap was placed into the primary defect and sutured into place.
Alar Island Pedicle Flap Text: The defect edges were debeveled with a #15 scalpel blade. Given the location of the defect, shape of the defect and the proximity to the alar rim an island pedicle advancement flap was deemed most appropriate. Using a sterile surgical marker, an appropriate advancement flap was drawn incorporating the defect, outlining the appropriate donor tissue and placing the expected incisions within the nasal ala running parallel to the alar rim. The area thus outlined was incised with a #15 scalpel blade. The skin margins were undermined minimally to an appropriate distance in all directions around the primary defect and laterally outward around the island pedicle utilizing iris scissors.  There was minimal undermining beneath the pedicle flap. Following this, the designed flap was carried over into the primary defect and sutured into place.
Double Island Pedicle Flap Text: The defect edges were debeveled with a #15 scalpel blade. Given the location of the defect, shape of the defect and the proximity to free margins a double island pedicle advancement flap was deemed most appropriate. Using a sterile surgical marker, an appropriate advancement flap was drawn incorporating the defect, outlining the appropriate donor tissue and placing the expected incisions within the relaxed skin tension lines where possible. The area thus outlined was incised deep to adipose tissue with a #15 scalpel blade. The skin margins were undermined to an appropriate distance in all directions around the primary defect and laterally outward around the island pedicle utilizing iris scissors.  There was minimal undermining beneath the pedicle flap. Following this, the flap was carried over into the primary defect and sutured into place.
Island Pedicle Flap-Requiring Vessel Identification Text: The defect edges were debeveled with a #15 scalpel blade. Given the location of the defect, shape of the defect and the proximity to free margins an island pedicle advancement flap was deemed most appropriate. Using a sterile surgical marker, an appropriate advancement flap was drawn, based on the axial vessel mentioned above, incorporating the defect, outlining the appropriate donor tissue and placing the expected incisions within the relaxed skin tension lines where possible. The area thus outlined was incised deep to adipose tissue with a #15 scalpel blade. The skin margins were undermined to an appropriate distance in all directions around the primary defect and laterally outward around the island pedicle utilizing iris scissors.  There was minimal undermining beneath the pedicle flap. Following this, the designed flap was carried over into the primary defect and sutured into place.
Keystone Flap Text: The defect edges were debeveled with a #15 scalpel blade. Given the location of the defect, shape of the defect a keystone flap was deemed most appropriate. Using a sterile surgical marker, an appropriate keystone flap was drawn incorporating the defect, outlining the appropriate donor tissue and placing the expected incisions within the relaxed skin tension lines where possible. The area thus outlined was incised deep to adipose tissue with a #15 scalpel blade. The skin margins were undermined to an appropriate distance in all directions around the primary defect and laterally outward around the flap utilizing iris scissors. Following this, the designed flap was carried into the primary defect and sutured into place.
O-T Plasty Text: The defect edges were debeveled with a #15 scalpel blade. Given the location of the defect, shape of the defect and the proximity to free margins an O-T plasty was deemed most appropriate. Using a sterile surgical marker, an appropriate O-T plasty was drawn incorporating the defect and placing the expected incisions within the relaxed skin tension lines where possible. The area thus outlined was incised deep to adipose tissue with a #15 scalpel blade. The skin margins were undermined to an appropriate distance in all directions utilizing iris scissors. Following this, the designed flap was carried over into the primary defect and sutured into place.
O-Z Plasty Text: The defect edges were debeveled with a #15 scalpel blade. Given the location of the defect, shape of the defect and the proximity to free margins an O-Z plasty (double transposition flap) was deemed most appropriate. Using a sterile surgical marker, the appropriate transposition flaps were drawn incorporating the defect and placing the expected incisions within the relaxed skin tension lines where possible. The area thus outlined was incised deep to adipose tissue with a #15 scalpel blade. The skin margins were undermined to an appropriate distance in all directions utilizing iris scissors. Hemostasis was achieved with electrocautery. The flaps were then transposed and carried over into place, one clockwise and the other counterclockwise, and anchored with interrupted buried subcutaneous sutures.
Double O-Z Plasty Text: The defect edges were debeveled with a #15 scalpel blade. Given the location of the defect, shape of the defect and the proximity to free margins a Double O-Z plasty (double transposition flap) was deemed most appropriate. Using a sterile surgical marker, the appropriate transposition flaps were drawn incorporating the defect and placing the expected incisions within the relaxed skin tension lines where possible. The area thus outlined was incised deep to adipose tissue with a #15 scalpel blade. The skin margins were undermined to an appropriate distance in all directions utilizing iris scissors. Hemostasis was achieved with electrocautery. The flaps were then transposed and carried over into place, one clockwise and the other counterclockwise, and anchored with interrupted buried subcutaneous sutures.
V-Y Plasty Text: The defect edges were debeveled with a #15 scalpel blade. Given the location of the defect, shape of the defect and the proximity to free margins an V-Y advancement flap was deemed most appropriate. Using a sterile surgical marker, an appropriate advancement flap was drawn incorporating the defect and placing the expected incisions within the relaxed skin tension lines where possible. The area thus outlined was incised deep to adipose tissue with a #15 scalpel blade. The skin margins were undermined to an appropriate distance in all directions utilizing iris scissors. Following this, the designed flap was advanced and carried over into the primary defect and sutured into place.
H Plasty Text: Given the location of the defect, shape of the defect and the proximity to free margins a H-plasty was deemed most appropriate for repair. Using a sterile surgical marker, the appropriate advancement arms of the H-plasty were drawn incorporating the defect and placing the expected incisions within the relaxed skin tension lines where possible. The area thus outlined was incised deep to adipose tissue with a #15 scalpel blade. The skin margins were undermined to an appropriate distance in all directions utilizing iris scissors.  The opposing advancement arms were then advanced and carried over into place in opposite direction and anchored with interrupted buried subcutaneous sutures.
W Plasty Text: The lesion was extirpated to the level of the fat with a #15 scalpel blade. Given the location of the defect, shape of the defect and the proximity to free margins a W-plasty was deemed most appropriate for repair. Using a sterile surgical marker, the appropriate transposition arms of the W-plasty were drawn incorporating the defect and placing the expected incisions within the relaxed skin tension lines where possible. The area thus outlined was incised deep to adipose tissue with a #15 scalpel blade. The skin margins were undermined to an appropriate distance in all directions utilizing iris scissors. The opposing transposition arms were then transposed and carried over into place in opposite direction and anchored with interrupted buried subcutaneous sutures.
Z Plasty Text: The lesion was extirpated to the level of the fat with a #15 scalpel blade. Given the location of the defect, shape of the defect and the proximity to free margins a Z-plasty was deemed most appropriate for repair. Using a sterile surgical marker, the appropriate transposition arms of the Z-plasty were drawn incorporating the defect and placing the expected incisions within the relaxed skin tension lines where possible. The area thus outlined was incised deep to adipose tissue with a #15 scalpel blade. The skin margins were undermined to an appropriate distance in all directions utilizing iris scissors. The opposing transposition arms were then transposed and carried over into place in opposite direction and anchored with interrupted buried subcutaneous sutures.
Double Z Plasty Text: The lesion was extirpated to the level of the fat with a #15 scalpel blade. Given the location of the defect, shape of the defect and the proximity to free margins a double Z-plasty was deemed most appropriate for repair. Using a sterile surgical marker, the appropriate transposition arms of the double Z-plasty were drawn incorporating the defect and placing the expected incisions within the relaxed skin tension lines where possible. The area thus outlined was incised deep to adipose tissue with a #15 scalpel blade. The skin margins were undermined to an appropriate distance in all directions utilizing iris scissors. The opposing transposition arms were then transposed and carried over into place in opposite direction and anchored with interrupted buried subcutaneous sutures.
Zygomaticofacial Flap Text: Given the location of the defect, shape of the defect and the proximity to free margins a zygomaticofacial flap was deemed most appropriate for repair. Using a sterile surgical marker, the appropriate flap was drawn incorporating the defect and placing the expected incisions within the relaxed skin tension lines where possible. The area thus outlined was incised deep to adipose tissue with a #15 scalpel blade with preservation of a vascular pedicle.  The skin margins were undermined to an appropriate distance in all directions utilizing iris scissors. The flap was then carried over into the defect and anchored with interrupted buried subcutaneous sutures.
Cheek Interpolation Flap Text: A decision was made to reconstruct the defect utilizing an interpolation axial flap and a staged reconstruction.  A telfa template was made of the defect.  This telfa template was then used to outline the Cheek Interpolation flap.  The donor area for the pedicle flap was then injected with anesthesia.  The flap was excised through the skin and subcutaneous tissue down to the layer of the underlying musculature.  The interpolation flap was carefully excised within this deep plane to maintain its blood supply.  The edges of the donor site were undermined.   The donor site was closed in a primary fashion.  The pedicle was then rotated into position and sutured.  Once the tube was sutured into place, adequate blood supply was confirmed with blanching and refill.  The pedicle was then wrapped with xeroform gauze and dressed appropriately with a telfa and gauze bandage to ensure continued blood supply and protect the attached pedicle.
Cheek-To-Nose Interpolation Flap Text: A decision was made to reconstruct the defect utilizing an interpolation axial flap and a staged reconstruction.  A telfa template was made of the defect.  This telfa template was then used to outline the Cheek-To-Nose Interpolation flap.  The donor area for the pedicle flap was then injected with anesthesia.  The flap was excised through the skin and subcutaneous tissue down to the layer of the underlying musculature.  The interpolation flap was carefully excised within this deep plane to maintain its blood supply.  The edges of the donor site were undermined.   The donor site was closed in a primary fashion.  The pedicle was then rotated into position and sutured.  Once the tube was sutured into place, adequate blood supply was confirmed with blanching and refill.  The pedicle was then wrapped with xeroform gauze and dressed appropriately with a telfa and gauze bandage to ensure continued blood supply and protect the attached pedicle.
Interpolation Flap Text: A decision was made to reconstruct the defect utilizing an interpolation axial flap and a staged reconstruction.  A telfa template was made of the defect.  This telfa template was then used to outline the interpolation flap.  The donor area for the pedicle flap was then injected with anesthesia.  The flap was excised through the skin and subcutaneous tissue down to the layer of the underlying musculature.  The interpolation flap was carefully excised within this deep plane to maintain its blood supply.  The edges of the donor site were undermined.   The donor site was closed in a primary fashion.  The pedicle was then rotated into position and sutured.  Once the tube was sutured into place, adequate blood supply was confirmed with blanching and refill.  The pedicle was then wrapped with xeroform gauze and dressed appropriately with a telfa and gauze bandage to ensure continued blood supply and protect the attached pedicle.
Melolabial Interpolation Flap Text: A decision was made to reconstruct the defect utilizing an interpolation axial flap and a staged reconstruction.  A telfa template was made of the defect.  This telfa template was then used to outline the melolabial interpolation flap.  The donor area for the pedicle flap was then injected with anesthesia.  The flap was excised through the skin and subcutaneous tissue down to the layer of the underlying musculature.  The pedicle flap was carefully excised within this deep plane to maintain its blood supply.  The edges of the donor site were undermined.   The donor site was closed in a primary fashion.  The pedicle was then rotated into position and sutured.  Once the tube was sutured into place, adequate blood supply was confirmed with blanching and refill.  The pedicle was then wrapped with xeroform gauze and dressed appropriately with a telfa and gauze bandage to ensure continued blood supply and protect the attached pedicle.
Mastoid Interpolation Flap Text: A decision was made to reconstruct the defect utilizing an interpolation axial flap and a staged reconstruction.  A telfa template was made of the defect.  This telfa template was then used to outline the mastoid interpolation flap.  The donor area for the pedicle flap was then injected with anesthesia.  The flap was excised through the skin and subcutaneous tissue down to the layer of the underlying musculature.  The pedicle flap was carefully excised within this deep plane to maintain its blood supply.  The edges of the donor site were undermined.   The donor site was closed in a primary fashion.  The pedicle was then rotated into position and sutured.  Once the tube was sutured into place, adequate blood supply was confirmed with blanching and refill.  The pedicle was then wrapped with xeroform gauze and dressed appropriately with a telfa and gauze bandage to ensure continued blood supply and protect the attached pedicle.
Posterior Auricular Interpolation Flap Text: A decision was made to reconstruct the defect utilizing an interpolation axial flap and a staged reconstruction.  A telfa template was made of the defect.  This telfa template was then used to outline the posterior auricular interpolation flap.  The donor area for the pedicle flap was then injected with anesthesia.  The flap was excised through the skin and subcutaneous tissue down to the layer of the underlying musculature.  The pedicle flap was carefully excised within this deep plane to maintain its blood supply.  The edges of the donor site were undermined.   The donor site was closed in a primary fashion.  The pedicle was then rotated into position and sutured.  Once the tube was sutured into place, adequate blood supply was confirmed with blanching and refill.  The pedicle was then wrapped with xeroform gauze and dressed appropriately with a telfa and gauze bandage to ensure continued blood supply and protect the attached pedicle.
Paramedian Forehead Flap Text: A decision was made to reconstruct the defect utilizing an interpolation axial flap and a staged reconstruction.  A telfa template was made of the defect.  This telfa template was then used to outline the paramedian forehead pedicle flap.  The donor area for the pedicle flap was then injected with anesthesia.  The flap was excised through the skin and subcutaneous tissue down to the layer of the underlying musculature.  The pedicle flap was carefully excised within this deep plane to maintain its blood supply.  The edges of the donor site were undermined.   The donor site was closed in a primary fashion.  The pedicle was then rotated into position and sutured.  Once the tube was sutured into place, adequate blood supply was confirmed with blanching and refill.  The pedicle was then wrapped with xeroform gauze and dressed appropriately with a telfa and gauze bandage to ensure continued blood supply and protect the attached pedicle.
Abbe Flap (Upper To Lower Lip) Text: The defect of the lower lip was assessed and measured.  Given the location and size of the defect, an Abbe flap was deemed most appropriate. Using a sterile surgical marker, an appropriate Abbe flap was measured and drawn on the upper lip. Local anesthesia was then infiltrated.  A scalpel was then used to incise the upper lip through and through the skin, vermilion, muscle and mucosa, leaving the flap pedicled on the opposite side.  The flap was then rotated and transferred to the lower lip defect.  The flap was then sutured into place with a three layer technique, closing the orbicularis oris muscle layer with subcutaneous buried sutures, followed by a mucosal layer and an epidermal layer.
Abbe Flap (Lower To Upper Lip) Text: The defect of the upper lip was assessed and measured.  Given the location and size of the defect, an Abbe flap was deemed most appropriate. Using a sterile surgical marker, an appropriate Abbe flap was measured and drawn on the lower lip. Local anesthesia was then infiltrated. A scalpel was then used to incise the upper lip through and through the skin, vermilion, muscle and mucosa, leaving the flap pedicled on the opposite side.  The flap was then rotated and transferred to the lower lip defect.  The flap was then sutured into place with a three layer technique, closing the orbicularis oris muscle layer with subcutaneous buried sutures, followed by a mucosal layer and an epidermal layer.
Estlander Flap (Upper To Lower Lip) Text: The defect of the lower lip was assessed and measured.  Given the location and size of the defect, an Estlander flap was deemed most appropriate. Using a sterile surgical marker, an appropriate Estlander flap was measured and drawn on the upper lip. Local anesthesia was then infiltrated. A scalpel was then used to incise the lateral aspect of the flap, through skin, muscle and mucosa, leaving the flap pedicled medially.  The flap was then rotated and positioned to fill the lower lip defect.  The flap was then sutured into place with a three layer technique, closing the orbicularis oris muscle layer with subcutaneous buried sutures, followed by a mucosal layer and an epidermal layer.
Cheiloplasty (Less Than 50%) Text: A decision was made to reconstruct the defect with a  cheiloplasty.  The defect was undermined extensively.  Additional orbicularis oris muscle was excised with a 15 blade scalpel.  The defect was converted into a full thickness wedge, of less than 50% of the vertical height of the lip, to facilite a better cosmetic result.  Small vessels were then tied off with 5-0 monocyrl. The orbicularis oris, superficial fascia, adipose and dermis were then reapproximated.  After the deeper layers were approximated the epidermis was reapproximated with particular care given to realign the vermilion border.
Cheiloplasty (Complex) Text: A decision was made to reconstruct the defect with a  cheiloplasty.  The defect was undermined extensively.  Additional orbicularis oris muscle was excised with a 15 blade scalpel.  The defect was converted into a full thickness wedge to facilite a better cosmetic result.  Small vessels were then tied off with 5-0 monocyrl. The orbicularis oris, superficial fascia, adipose and dermis were then reapproximated.  After the deeper layers were approximated the epidermis was reapproximated with particular care given to realign the vermilion border.
Ear Wedge Repair Text: A wedge excision was completed by carrying down an excision through the full thickness of the ear and cartilage with an inward facing Burow's triangle. The wound was then closed in a layered fashion.
Full Thickness Lip Wedge Repair (Flap) Text: Given the location of the defect and the proximity to free margins a full thickness wedge repair was deemed most appropriate. Using a sterile surgical marker, the appropriate repair was drawn incorporating the defect and placing the expected incisions perpendicular to the vermilion border.  The vermilion border was also meticulously outlined to ensure appropriate reapproximation during the repair.  The area thus outlined was incised through and through with a #15 scalpel blade.  The muscularis and dermis were reaproximated with deep sutures following hemostasis. Care was taken to realign the vermilion border before proceeding with the superficial closure.  Once the vermilion was realigned the superfical and mucosal closure was finished.
Ftsg Text: The defect edges were debeveled with a #15 scalpel blade. Given the location of the defect, shape of the defect and the proximity to free margins a full thickness skin graft was deemed most appropriate. Using a sterile surgical marker, the primary defect shape was transferred to the donor site. The area thus outlined was incised deep to adipose tissue with a #15 scalpel blade.  The harvested graft was then trimmed of adipose tissue until only dermis and epidermis was left.  The skin margins of the secondary defect were undermined to an appropriate distance in all directions utilizing iris scissors.  The secondary defect was closed with interrupted buried subcutaneous sutures.  The skin edges were then re-apposed with running  sutures.  The skin graft was then placed in the primary defect and oriented appropriately.
Split-Thickness Skin Graft Text: The defect edges were debeveled with a #15 scalpel blade. Given the location of the defect, shape of the defect and the proximity to free margins a split thickness skin graft was deemed most appropriate. Using a sterile surgical marker, the primary defect shape was transferred to the donor site. The split thickness graft was then harvested.  The skin graft was then placed in the primary defect and oriented appropriately.
Pinch Graft Text: The defect edges were debeveled with a #15 scalpel blade. Given the location of the defect, shape of the defect and the proximity to free margins a pinch graft was deemed most appropriate. Using a sterile surgical marker, the primary defect shape was transferred to the donor site. The area thus outlined was incised deep to adipose tissue with a #15 scalpel blade.  The harvested graft was then trimmed of adipose tissue until only dermis and epidermis was left. The skin margins of the secondary defect were undermined to an appropriate distance in all directions utilizing iris scissors.  The secondary defect was closed with interrupted buried subcutaneous sutures.  The skin edges were then re-apposed with running  sutures.  The skin graft was then placed in the primary defect and oriented appropriately.
Burow's Graft Text: The defect edges were debeveled with a #15 scalpel blade. Given the location of the defect, shape of the defect, the proximity to free margins and the presence of a standing cone deformity a Burow's skin graft was deemed most appropriate. The standing cone was removed and this tissue was then trimmed to the shape of the primary defect. The adipose tissue was also removed until only dermis and epidermis were left.  The skin margins of the secondary defect were undermined to an appropriate distance in all directions utilizing iris scissors.  The secondary defect was closed with interrupted buried subcutaneous sutures.  The skin edges were then re-apposed with running  sutures.  The skin graft was then placed in the primary defect and oriented appropriately.
Cartilage Graft Text: The defect edges were debeveled with a #15 scalpel blade. Given the location of the defect, shape of the defect, the fact the defect involved a full thickness cartilage defect a cartilage graft was deemed most appropriate.  An appropriate donor site was identified, cleansed, and anesthetized. The cartilage graft was then harvested and transferred to the recipient site, oriented appropriately and then sutured into place.  The secondary defect was then repaired using a primary closure.
Composite Graft Text: The defect edges were debeveled with a #15 scalpel blade. Given the location of the defect, shape of the defect, the proximity to free margins and the fact the defect was full thickness a composite graft was deemed most appropriate.  The defect was outline and then transferred to the donor site.  A full thickness graft was then excised from the donor site. The graft was then placed in the primary defect, oriented appropriately and then sutured into place.  The secondary defect was then repaired using a primary closure.
Epidermal Autograft Text: The defect edges were debeveled with a #15 scalpel blade. Given the location of the defect, shape of the defect and the proximity to free margins an epidermal autograft was deemed most appropriate. Using a sterile surgical marker, the primary defect shape was transferred to the donor site. The epidermal graft was then harvested.  The skin graft was then placed in the primary defect and oriented appropriately.
Dermal Autograft Text: The defect edges were debeveled with a #15 scalpel blade. Given the location of the defect, shape of the defect and the proximity to free margins a dermal autograft was deemed most appropriate. Using a sterile surgical marker, the primary defect shape was transferred to the donor site. The area thus outlined was incised deep to adipose tissue with a #15 scalpel blade.  The harvested graft was then trimmed of adipose and epidermal tissue until only dermis was left.  The skin graft was then placed in the primary defect and oriented appropriately.
Skin Substitute Text: The defect edges were debeveled with a #15 scalpel blade. Given the location of the defect, shape of the defect and the proximity to free margins a skin substitute graft was deemed most appropriate.  The graft material was trimmed to fit the size of the defect. The graft was then placed in the primary defect and oriented appropriately.
Tissue Cultured Epidermal Autograft Text: The defect edges were debeveled with a #15 scalpel blade. Given the location of the defect, shape of the defect and the proximity to free margins a tissue cultured epidermal autograft was deemed most appropriate.  The graft was then trimmed to fit the size of the defect.  The graft was then placed in the primary defect and oriented appropriately.
Xenograft Text: The defect edges were debeveled with a #15 scalpel blade. Given the location of the defect, shape of the defect and the proximity to free margins a xenograft was deemed most appropriate.  The graft was then trimmed to fit the size of the defect.  The graft was then placed in the primary defect and oriented appropriately.
Purse String (Simple) Text: Given the location of the defect and the characteristics of the surrounding skin a purse string closure was deemed most appropriate.  Undermining was performed circumferentially around the surgical defect.  A purse string suture was then placed and tightened.
Purse String (Intermediate) Text: Given the location of the defect and the characteristics of the surrounding skin a purse string intermediate closure was deemed most appropriate.  Undermining was performed circumferentially around the surgical defect.  A purse string suture was then placed and tightened.
Partial Purse String (Simple) Text: Given the location of the defect and the characteristics of the surrounding skin a simple purse string closure was deemed most appropriate.  Undermining was performed circumferentially around the surgical defect.  A purse string suture was then placed and tightened. Wound tension only allowed a partial closure of the circular defect.
Partial Purse String (Intermediate) Text: Given the location of the defect and the characteristics of the surrounding skin an intermediate purse string closure was deemed most appropriate.  Undermining was performed circumferentially around the surgical defect.  A purse string suture was then placed and tightened. Wound tension only allowed a partial closure of the circular defect.
Localized Dermabrasion With Wire Brush Text: The patient was draped in routine manner.  Localized dermabrasion using 3 x 17 mm wire brush was performed in routine manner to papillary dermis. This spot dermabrasion is being performed to complete skin cancer reconstruction. It also will eliminate the other sun damaged precancerous cells that are known to be part of the regional effect of a lifetime's worth of sun exposure. This localized dermabrasion is therapeutic and should not be considered cosmetic in any regard.
Tarsorrhaphy Text: A tarsorrhaphy was performed using Frost sutures.
Intermediate Repair And Flap Additional Text (Will Appearing After The Standard Complex Repair Text): The intermediate repair was not sufficient to completely close the primary defect. The remaining additional defect was repaired with the flap mentioned below.
Intermediate Repair And Graft Additional Text (Will Appearing After The Standard Complex Repair Text): The intermediate repair was not sufficient to completely close the primary defect. The remaining additional defect was repaired with the graft mentioned below.
Complex Repair And Flap Additional Text (Will Appearing After The Standard Complex Repair Text): The complex repair was not sufficient to completely close the primary defect. The remaining additional defect was repaired with the flap mentioned below.
Complex Repair And Graft Additional Text (Will Appearing After The Standard Complex Repair Text): The complex repair was not sufficient to completely close the primary defect. The remaining additional defect was repaired with the graft mentioned below.
Eyelid Full Thickness Repair - 14816: The eyelid defect was full thickness which required a wedge repair of the eyelid. Special care was taken to ensure that the eyelid margin was realligned when placing sutures.
Eyelid Partial Thickness Repair - 00997: The eyelid defect was partial thickness which required a wedge repair of the eyelid. Special care was taken to ensure that the eyelid margin was realligned when placing sutures.
Manual Repair Warning Statement: We plan on removing the manually selected variable below in favor of our much easier automatic structured text blocks found in the previous tab. We decided to do this to help make the flow better and give you the full power of structured data. Manual selection is never going to be ideal in our platform and I would encourage you to avoid using manual selection from this point on, especially since I will be sunsetting this feature. It is important that you do one of two things with the customized text below. First, you can save all of the text in a word file so you can have it for future reference. Second, transfer the text to the appropriate area in the Library tab. Lastly, if there is a flap or graft type which we do not have you need to let us know right away so I can add it in before the variable is hidden. No need to panic, we plan to give you roughly 6 months to make the change.
Same Histology In Subsequent Stages Text: The pattern and morphology of the tumor is as described in the first stage.
No Residual Tumor Seen Histology Text: There were no malignant cells seen in the sections examined.
Inflammation Suggestive Of Cancer Camouflage Histology Text: There was a dense lymphocytic infiltrate which prevented adequate histologic evaluation of adjacent structures.
Bcc Histology Text: There were numerous aggregates of basaloid cells.
Bcc Infiltrative Histology Text: There were numerous aggregates of basaloid cells demonstrating an infiltrative pattern.
Mart-1 - Positive Histology Text: MART-1 staining demonstrates areas of higher density and clustering of melanocytes with Pagetoid spread upwards within the epidermis. The surgical margins are positive for tumor cells.
Mart-1 - Negative Histology Text: MART-1 staining demonstrates a normal density and pattern of melanocytes along the dermal-epidermal junction. The surgical margins are negative for tumor cells.
Information: Selecting Yes will display possible errors in your note based on the variables you have selected. This validation is only offered as a suggestion for you. PLEASE NOTE THAT THE VALIDATION TEXT WILL BE REMOVED WHEN YOU FINALIZE YOUR NOTE. IF YOU WANT TO FAX A PRELIMINARY NOTE YOU WILL NEED TO TOGGLE THIS TO 'NO' IF YOU DO NOT WANT IT IN YOUR FAXED NOTE.

## 2024-03-05 NOTE — PROCEDURE: MIPS QUALITY
Quality 47: Advance Care Plan: Advance Care Planning discussed and documented; advance care plan or surrogate decision maker documented in the medical record.
Quality 431: Preventive Care And Screening: Unhealthy Alcohol Use - Screening: Patient not identified as an unhealthy alcohol user when screened for unhealthy alcohol use using a systematic screening method
Detail Level: Detailed
Quality 226: Preventive Care And Screening: Tobacco Use: Screening And Cessation Intervention: Patient screened for tobacco use, is a smoker AND received Cessation Counseling within measurement period or in the six months prior to the measurement period

## (undated) DEVICE — SUTURE BONE WAX   W31G

## (undated) DEVICE — ***USE 138550*** SUTURE VICRYL 1 J702D CR MO-4 18IN VIOLET

## (undated) DEVICE — TUBING SMOKE EVAC PENCIL COATED

## (undated) DEVICE — ***USE 138090*** COTTONOIDS 1/2 X 1

## (undated) DEVICE — HEAD REST WITH DERMAPROX INSERT

## (undated) DEVICE — SUTURE VICRYL 2-0  J762D CR CP-2 18IN

## (undated) DEVICE — GLOVE SZ 8 LINER PROTEXIS PI BL

## (undated) DEVICE — COVER MAYO STAND

## (undated) DEVICE — DRESSING MEPILEX 4X4 BORDER

## (undated) DEVICE — TIP BOVIE BLADE COATED 3IN E1450G

## (undated) DEVICE — DEVICE NPWT PICO 10 X 30CM

## (undated) DEVICE — Device

## (undated) DEVICE — GLOVE SZ 7.5 PROTEXIS CLASSIC LATEX

## (undated) DEVICE — TIP SUCTION FRAZIER 12FR

## (undated) DEVICE — PACK RFID LAMINECTOMY

## (undated) DEVICE — FORCEP BAYONET BIPOLAR 8"

## (undated) DEVICE — DRAPE STERI TOWEL PLASTIC 18X24

## (undated) DEVICE — HEAD REST WITH DERMAPROX SQUARE

## (undated) DEVICE — COVER LIGHTHANDLE

## (undated) DEVICE — SOLN IRRIG .9%SOD 1000ML

## (undated) DEVICE — ***USE 138472*** SUTURE ETHILON 2-0   1697H

## (undated) DEVICE — NEEDLE SPINAL 18G X3-1-2IN

## (undated) DEVICE — GOWN SURGICAL REINFORCED X-LAR

## (undated) DEVICE — BLADE SCALPEL #15

## (undated) DEVICE — TUBE SUCTION 1/4INX20FT STERILE

## (undated) DEVICE — APPLICATOR CHLORAPREP 26ML ORANGE TINT

## (undated) DEVICE — STERISTRIP 1/2INX4IN

## (undated) DEVICE — MANIFOLD FOUR PORT NEPTUNE

## (undated) DEVICE — MASTISOL LIQUID ADHESIVE

## (undated) DEVICE — KIT SUTURE DELIVERY DURASTA

## (undated) DEVICE — *T* 3.0MM MATCH HEAD PRECISION NEURO BUR

## (undated) DEVICE — BLANKET UPPER BODY BAIR HUGGER

## (undated) DEVICE — DRAPE C-ARM X-RAY EQUIPMENT IMAGE

## (undated) DEVICE — TIP SUCTION YANKAUER

## (undated) DEVICE — PAD GROUND ELECTROSURGICAL W/CORD

## (undated) DEVICE — ***USE 57698*** SLEEVE FLOWTRON DVT CALF SINGLE USE